# Patient Record
Sex: FEMALE | Race: WHITE | NOT HISPANIC OR LATINO | URBAN - METROPOLITAN AREA
[De-identification: names, ages, dates, MRNs, and addresses within clinical notes are randomized per-mention and may not be internally consistent; named-entity substitution may affect disease eponyms.]

---

## 2019-08-24 ENCOUNTER — EMERGENCY (EMERGENCY)
Facility: HOSPITAL | Age: 29
LOS: 1 days | Discharge: OTHER INSTITUTION | End: 2019-08-24
Attending: EMERGENCY MEDICINE
Payer: COMMERCIAL

## 2019-08-24 VITALS
HEIGHT: 59 IN | OXYGEN SATURATION: 97 % | WEIGHT: 190.04 LBS | SYSTOLIC BLOOD PRESSURE: 140 MMHG | HEART RATE: 94 BPM | DIASTOLIC BLOOD PRESSURE: 89 MMHG | RESPIRATION RATE: 16 BRPM | TEMPERATURE: 99 F

## 2019-08-24 DIAGNOSIS — F60.3 BORDERLINE PERSONALITY DISORDER: ICD-10-CM

## 2019-08-24 DIAGNOSIS — F32.9 MAJOR DEPRESSIVE DISORDER, SINGLE EPISODE, UNSPECIFIED: ICD-10-CM

## 2019-08-24 LAB
ALBUMIN SERPL ELPH-MCNC: 4.6 G/DL — SIGNIFICANT CHANGE UP (ref 3.3–5.2)
ALP SERPL-CCNC: 63 U/L — SIGNIFICANT CHANGE UP (ref 40–120)
ALT FLD-CCNC: 14 U/L — SIGNIFICANT CHANGE UP
AMPHET UR-MCNC: NEGATIVE — SIGNIFICANT CHANGE UP
ANION GAP SERPL CALC-SCNC: 11 MMOL/L — SIGNIFICANT CHANGE UP (ref 5–17)
APPEARANCE UR: CLEAR — SIGNIFICANT CHANGE UP
APTT BLD: 33.9 SEC — SIGNIFICANT CHANGE UP (ref 27.5–36.3)
AST SERPL-CCNC: 14 U/L — SIGNIFICANT CHANGE UP
BACTERIA # UR AUTO: ABNORMAL
BARBITURATES UR SCN-MCNC: NEGATIVE — SIGNIFICANT CHANGE UP
BENZODIAZ UR-MCNC: POSITIVE
BILIRUB SERPL-MCNC: 0.4 MG/DL — SIGNIFICANT CHANGE UP (ref 0.4–2)
BILIRUB UR-MCNC: NEGATIVE — SIGNIFICANT CHANGE UP
BUN SERPL-MCNC: 9 MG/DL — SIGNIFICANT CHANGE UP (ref 8–20)
CALCIUM SERPL-MCNC: 9.7 MG/DL — SIGNIFICANT CHANGE UP (ref 8.6–10.2)
CHLORIDE SERPL-SCNC: 100 MMOL/L — SIGNIFICANT CHANGE UP (ref 98–107)
CO2 SERPL-SCNC: 26 MMOL/L — SIGNIFICANT CHANGE UP (ref 22–29)
COCAINE METAB.OTHER UR-MCNC: NEGATIVE — SIGNIFICANT CHANGE UP
COLOR SPEC: YELLOW — SIGNIFICANT CHANGE UP
CREAT SERPL-MCNC: 0.58 MG/DL — SIGNIFICANT CHANGE UP (ref 0.5–1.3)
D DIMER BLD IA.RAPID-MCNC: <150 NG/ML DDU — SIGNIFICANT CHANGE UP
DIFF PNL FLD: ABNORMAL
EPI CELLS # UR: SIGNIFICANT CHANGE UP
GLUCOSE SERPL-MCNC: 86 MG/DL — SIGNIFICANT CHANGE UP (ref 70–115)
GLUCOSE UR QL: NEGATIVE MG/DL — SIGNIFICANT CHANGE UP
HCG SERPL-ACNC: <4 MIU/ML — SIGNIFICANT CHANGE UP
HCT VFR BLD CALC: 39.2 % — SIGNIFICANT CHANGE UP (ref 34.5–45)
HGB BLD-MCNC: 12 G/DL — SIGNIFICANT CHANGE UP (ref 11.5–15.5)
INR BLD: 1.13 RATIO — SIGNIFICANT CHANGE UP (ref 0.88–1.16)
KETONES UR-MCNC: ABNORMAL
LEUKOCYTE ESTERASE UR-ACNC: ABNORMAL
MCHC RBC-ENTMCNC: 24 PG — LOW (ref 27–34)
MCHC RBC-ENTMCNC: 30.6 GM/DL — LOW (ref 32–36)
MCV RBC AUTO: 78.2 FL — LOW (ref 80–100)
METHADONE UR-MCNC: NEGATIVE — SIGNIFICANT CHANGE UP
NITRITE UR-MCNC: NEGATIVE — SIGNIFICANT CHANGE UP
OPIATES UR-MCNC: NEGATIVE — SIGNIFICANT CHANGE UP
PCP SPEC-MCNC: SIGNIFICANT CHANGE UP
PCP UR-MCNC: POSITIVE
PH UR: 6.5 — SIGNIFICANT CHANGE UP (ref 5–8)
PLATELET # BLD AUTO: 278 K/UL — SIGNIFICANT CHANGE UP (ref 150–400)
POTASSIUM SERPL-MCNC: 4.4 MMOL/L — SIGNIFICANT CHANGE UP (ref 3.5–5.3)
POTASSIUM SERPL-SCNC: 4.4 MMOL/L — SIGNIFICANT CHANGE UP (ref 3.5–5.3)
PROT SERPL-MCNC: 7.2 G/DL — SIGNIFICANT CHANGE UP (ref 6.6–8.7)
PROT UR-MCNC: 15 MG/DL
PROTHROM AB SERPL-ACNC: 13 SEC — HIGH (ref 10–12.9)
RBC # BLD: 5.01 M/UL — SIGNIFICANT CHANGE UP (ref 3.8–5.2)
RBC # FLD: 16 % — HIGH (ref 10.3–14.5)
RBC CASTS # UR COMP ASSIST: SIGNIFICANT CHANGE UP /HPF (ref 0–4)
SODIUM SERPL-SCNC: 137 MMOL/L — SIGNIFICANT CHANGE UP (ref 135–145)
SP GR SPEC: 1.01 — SIGNIFICANT CHANGE UP (ref 1.01–1.02)
THC UR QL: POSITIVE
TROPONIN T SERPL-MCNC: <0.01 NG/ML — SIGNIFICANT CHANGE UP (ref 0–0.06)
UROBILINOGEN FLD QL: NEGATIVE MG/DL — SIGNIFICANT CHANGE UP
WBC # BLD: 7.22 K/UL — SIGNIFICANT CHANGE UP (ref 3.8–10.5)
WBC # FLD AUTO: 7.22 K/UL — SIGNIFICANT CHANGE UP (ref 3.8–10.5)
WBC UR QL: ABNORMAL

## 2019-08-24 PROCEDURE — 71046 X-RAY EXAM CHEST 2 VIEWS: CPT | Mod: 26

## 2019-08-24 PROCEDURE — 99284 EMERGENCY DEPT VISIT MOD MDM: CPT

## 2019-08-24 PROCEDURE — 93010 ELECTROCARDIOGRAM REPORT: CPT

## 2019-08-24 PROCEDURE — 90792 PSYCH DIAG EVAL W/MED SRVCS: CPT

## 2019-08-24 RX ORDER — LAMOTRIGINE 25 MG/1
250 TABLET, ORALLY DISINTEGRATING ORAL DAILY
Refills: 0 | Status: DISCONTINUED | OUTPATIENT
Start: 2019-08-24 | End: 2019-08-30

## 2019-08-24 RX ORDER — ALPRAZOLAM 0.25 MG
2 TABLET ORAL THREE TIMES A DAY
Refills: 0 | Status: DISCONTINUED | OUTPATIENT
Start: 2019-08-24 | End: 2019-08-24

## 2019-08-24 RX ORDER — CITALOPRAM 10 MG/1
40 TABLET, FILM COATED ORAL DAILY
Refills: 0 | Status: DISCONTINUED | OUTPATIENT
Start: 2019-08-24 | End: 2019-08-30

## 2019-08-24 RX ORDER — NITROFURANTOIN MACROCRYSTAL 50 MG
100 CAPSULE ORAL
Refills: 0 | Status: DISCONTINUED | OUTPATIENT
Start: 2019-08-24 | End: 2019-08-30

## 2019-08-24 RX ORDER — BUPROPION HYDROCHLORIDE 150 MG/1
150 TABLET, EXTENDED RELEASE ORAL DAILY
Refills: 0 | Status: DISCONTINUED | OUTPATIENT
Start: 2019-08-24 | End: 2019-08-30

## 2019-08-24 RX ORDER — NITROFURANTOIN MACROCRYSTAL 50 MG
100 CAPSULE ORAL ONCE
Refills: 0 | Status: COMPLETED | OUTPATIENT
Start: 2019-08-24 | End: 2019-08-24

## 2019-08-24 RX ORDER — LANOLIN ALCOHOL/MO/W.PET/CERES
3 CREAM (GRAM) TOPICAL AT BEDTIME
Refills: 0 | Status: DISCONTINUED | OUTPATIENT
Start: 2019-08-24 | End: 2019-08-30

## 2019-08-24 RX ADMIN — Medication 100 MILLIGRAM(S): at 22:22

## 2019-08-24 RX ADMIN — Medication 100 MILLIGRAM(S): at 15:47

## 2019-08-24 RX ADMIN — Medication 2 MILLIGRAM(S): at 22:22

## 2019-08-24 RX ADMIN — CITALOPRAM 40 MILLIGRAM(S): 10 TABLET, FILM COATED ORAL at 22:23

## 2019-08-24 NOTE — ED BEHAVIORAL HEALTH ASSESSMENT NOTE - SUICIDE RISK FACTORS
Access to means (pills, firearms, etc.)/Agitation/severe anxiety/Mood episode/Perceived burden on family and others/Substance abuse/dependence

## 2019-08-24 NOTE — ED PROVIDER NOTE - OBJECTIVE STATEMENT
29 year old female with PMHx bipolar disorder, anxiety, depression, borderline personality disorder presents to the ED for CP which started this morning. Pt states CP occurs when she takes deep breaths but is not present at rest. Pain is nonexertional in nature. Has not seen cardiologist recently. Denies SOB, cough, jaw pain, sweats, vomiting, recent travel, h/o CA, or taking hormones. Pt has h/o panic attacks but states this episode felt different then previous. Admits she has recently been more stressed than usual and that her  has told her she needs inpatient psychiatric care. States yesterday she cut herself and she occasionally has thoughts of SI in the past few days but denies any plan. NO HI. Pts psychiatrist is affiliated with [facility]; NP Dina Bui.
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
## 2019-08-24 NOTE — ED ADULT NURSE REASSESSMENT NOTE - NS ED NURSE REASSESS COMMENT FT1
assumed care of pt assumed care of patient. patient alert and cooperative. patient signed legal papers for admission SW working on placement . patient spoke with  and made aware to plan. patient resting with yellow gown and red socks.

## 2019-08-24 NOTE — ED ADULT NURSE NOTE - OBJECTIVE STATEMENT
Patient complaining of chest discomfort this moring she described as an anxiety attack, resolved, states she has intermittent suicide thoughts without plan over the past two weeks, bipolar and needs med adjustment

## 2019-08-24 NOTE — ED PROVIDER NOTE - CLINICAL SUMMARY MEDICAL DECISION MAKING FREE TEXT BOX
29 year old female with bipolar, borer line personality, anxiety, depression presents to ED for pleuritic chest pain. Likely due to anxiety. Given SI will obtain labs and move to  for psych eval.

## 2019-08-24 NOTE — ED BEHAVIORAL HEALTH ASSESSMENT NOTE - DETAILS
h/o cutting age 17, last 2 days ago. to follow currently with inlaws CP upon admission medically cleared n/a

## 2019-08-24 NOTE — ED PROVIDER NOTE - ATTENDING CONTRIBUTION TO CARE
I, Glenroy Powers, performed a face to face bedside interview with this patient regarding history of present illness, review of symptoms and relevant past medical, social and family history.  I completed an independent physical examination. I have communicated the patient’s plan of care and disposition with the ACP.    29 year old female with PMHx bipolar disorder, anxiety, depression, borderline personality disorder presents to the ED for CP which started this morning. Pt states CP occurs when she takes deep breaths but is not present at rest. . Pt has h/o panic attacks but states this episode felt different then previous. Admits she has recently been more stressed than usual and that her  has told her she needs inpatient psychiatric care. pe  awake alert in nad; heent ncat neck supple aliyah s1 s1  lungs clear abd soft ext left arm superficial abrasions;   dx depression; eval bh

## 2019-08-24 NOTE — ED BEHAVIORAL HEALTH ASSESSMENT NOTE - RISK ASSESSMENT
Acute Suicide Risk  (  ) High   (x  ) Moderate   (  ) Low   (  ) Unable to determine   Rationale:     Elevated Chronic Risk   ( x ) Yes   Details:   (  ) No

## 2019-08-24 NOTE — ED BEHAVIORAL HEALTH ASSESSMENT NOTE - DESCRIPTION
Cooperative anxious depressed, no behavioral disturbances.  ICU Vital Signs Last 24 Hrs  T(C): 37 (24 Aug 2019 15:34), Max: 37.2 (24 Aug 2019 11:43)  T(F): 98.6 (24 Aug 2019 15:34), Max: 98.9 (24 Aug 2019 11:43)  HR: 80 (24 Aug 2019 15:34) (63 - 94)  BP: 120/79 (24 Aug 2019 15:34) (120/79 - 140/89)  BP(mean): --  ABP: --  ABP(mean): --  RR: 18 (24 Aug 2019 15:34) (16 - 18)  SpO2: 99% (24 Aug 2019 15:34) (97% - 100%) none known , lives with  and 3 yo daughter

## 2019-08-24 NOTE — ED PROVIDER NOTE - CHPI ED SYMPTOMS NEG
no dizziness/no vomiting/no cough/no diaphoresis/no nausea/no chills/no syncope/no shortness of breath
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
## 2019-08-24 NOTE — ED ADULT NURSE NOTE - PMH
Anemia    Bipolar affective disorder, current episode depressed, current episode severity unspecified    HTN (hypertension)

## 2019-08-24 NOTE — ED PROVIDER NOTE - PHYSICAL EXAMINATION
+ SI, cooperative, vertical like abrasions to the L arm, heeling, likely due to cutting   Lungs CTA, = chest rise and fall   chest wall nontender to palp  abd soft and nontender, no rebound or guarding   A/O x 3, NAD
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
## 2019-08-24 NOTE — ED ADULT NURSE REASSESSMENT NOTE - NS ED NURSE REASSESS COMMENT FT1
patient medicated as ordered. patient on phone with  who is upset that no one will speak to him of the phone.  HIPPA explained to patient and .  patient understands that at this time there is no bed available for tonight and the she will be staying here til am and transferred in am when a bed becomes available.  safe environment maintained

## 2019-08-24 NOTE — ED ADULT NURSE NOTE - NSIMPLEMENTINTERV_GEN_ALL_ED
Implemented All Fall with Harm Risk Interventions:  [place] to call system. Call bell, personal items and telephone within reach. Instruct patient to call for assistance. Room bathroom lighting operational. Non-slip footwear when patient is off stretcher. Physically safe environment: no spills, clutter or unnecessary equipment. Stretcher in lowest position, wheels locked, appropriate side rails in place. Provide visual cue, wrist band, yellow gown, etc. Monitor gait and stability. Monitor for mental status changes and reorient to person, place, and time. Review medications for side effects contributing to fall risk. Reinforce activity limits and safety measures with patient and family. Provide visual clues: red socks.

## 2019-08-24 NOTE — ED ADULT TRIAGE NOTE - CHIEF COMPLAINT QUOTE
Pt BIBA A&Ox3 ambulatory in ED c/o chest discomfort x approx 2 hours with associated anxiety. Reports hx of feeling anxiety and depression, takes xanax normally. Denies any sob or difficulty breathing. Denies SI or HI. Pt in no apparent distress at this time, respirations even and unlabored.

## 2019-08-24 NOTE — ED ADULT NURSE REASSESSMENT NOTE - NS ED NURSE REASSESS COMMENT FT1
patient out of bed to bathroom voided. patient offered straight cath kit and states not needed at this time.  will maintain safe environment

## 2019-08-24 NOTE — ED BEHAVIORAL HEALTH NOTE - BEHAVIORAL HEALTH NOTE
SW NOTE:  presented pt to [facility]. Spoke to Pearl in admissions. They are reviewing. Pt is in agreement for psych transfer; signed 9.13 legals. SW to follow.

## 2019-08-25 ENCOUNTER — INPATIENT (INPATIENT)
Facility: HOSPITAL | Age: 29
LOS: 3 days | Discharge: HOME | DRG: 881 | End: 2019-08-29
Attending: PSYCHIATRY & NEUROLOGY | Admitting: PSYCHIATRY & NEUROLOGY
Payer: COMMERCIAL

## 2019-08-25 VITALS
HEART RATE: 62 BPM | DIASTOLIC BLOOD PRESSURE: 80 MMHG | RESPIRATION RATE: 18 BRPM | TEMPERATURE: 98 F | OXYGEN SATURATION: 99 % | SYSTOLIC BLOOD PRESSURE: 124 MMHG

## 2019-08-25 VITALS
RESPIRATION RATE: 16 BRPM | DIASTOLIC BLOOD PRESSURE: 86 MMHG | SYSTOLIC BLOOD PRESSURE: 137 MMHG | TEMPERATURE: 98 F | HEIGHT: 59 IN | OXYGEN SATURATION: 98 % | HEART RATE: 70 BPM | WEIGHT: 201.06 LBS

## 2019-08-25 DIAGNOSIS — F31.30 BIPOLAR DISORDER, CURRENT EPISODE DEPRESSED, MILD OR MODERATE SEVERITY, UNSPECIFIED: ICD-10-CM

## 2019-08-25 DIAGNOSIS — F32.9 MAJOR DEPRESSIVE DISORDER, SINGLE EPISODE, UNSPECIFIED: ICD-10-CM

## 2019-08-25 DIAGNOSIS — K21.9 GASTRO-ESOPHAGEAL REFLUX DISEASE WITHOUT ESOPHAGITIS: ICD-10-CM

## 2019-08-25 DIAGNOSIS — F17.200 NICOTINE DEPENDENCE, UNSPECIFIED, UNCOMPLICATED: ICD-10-CM

## 2019-08-25 DIAGNOSIS — R82.90 UNSPECIFIED ABNORMAL FINDINGS IN URINE: ICD-10-CM

## 2019-08-25 DIAGNOSIS — Z29.9 ENCOUNTER FOR PROPHYLACTIC MEASURES, UNSPECIFIED: ICD-10-CM

## 2019-08-25 PROCEDURE — 85027 COMPLETE CBC AUTOMATED: CPT

## 2019-08-25 PROCEDURE — 85610 PROTHROMBIN TIME: CPT

## 2019-08-25 PROCEDURE — 81001 URINALYSIS AUTO W/SCOPE: CPT

## 2019-08-25 PROCEDURE — 36415 COLL VENOUS BLD VENIPUNCTURE: CPT

## 2019-08-25 PROCEDURE — 80307 DRUG TEST PRSMV CHEM ANLYZR: CPT

## 2019-08-25 PROCEDURE — 80053 COMPREHEN METABOLIC PANEL: CPT

## 2019-08-25 PROCEDURE — 85379 FIBRIN DEGRADATION QUANT: CPT

## 2019-08-25 PROCEDURE — 85730 THROMBOPLASTIN TIME PARTIAL: CPT

## 2019-08-25 PROCEDURE — 93005 ELECTROCARDIOGRAM TRACING: CPT

## 2019-08-25 PROCEDURE — 84702 CHORIONIC GONADOTROPIN TEST: CPT

## 2019-08-25 PROCEDURE — 99285 EMERGENCY DEPT VISIT HI MDM: CPT | Mod: 25

## 2019-08-25 PROCEDURE — 84484 ASSAY OF TROPONIN QUANT: CPT

## 2019-08-25 PROCEDURE — 71046 X-RAY EXAM CHEST 2 VIEWS: CPT

## 2019-08-25 RX ORDER — NICOTINE POLACRILEX 2 MG
1 GUM BUCCAL DAILY
Refills: 0 | Status: DISCONTINUED | OUTPATIENT
Start: 2019-08-25 | End: 2019-08-29

## 2019-08-25 RX ORDER — HALOPERIDOL DECANOATE 100 MG/ML
5 INJECTION INTRAMUSCULAR EVERY 6 HOURS
Refills: 0 | Status: DISCONTINUED | OUTPATIENT
Start: 2019-08-25 | End: 2019-08-27

## 2019-08-25 RX ORDER — BUPROPION HYDROCHLORIDE 150 MG/1
150 TABLET, EXTENDED RELEASE ORAL DAILY
Refills: 0 | Status: DISCONTINUED | OUTPATIENT
Start: 2019-08-25 | End: 2019-08-26

## 2019-08-25 RX ORDER — PANTOPRAZOLE SODIUM 20 MG/1
40 TABLET, DELAYED RELEASE ORAL
Refills: 0 | Status: DISCONTINUED | OUTPATIENT
Start: 2019-08-25 | End: 2019-08-29

## 2019-08-25 RX ORDER — ACETAMINOPHEN 500 MG
650 TABLET ORAL ONCE
Refills: 0 | Status: COMPLETED | OUTPATIENT
Start: 2019-08-25 | End: 2019-08-25

## 2019-08-25 RX ORDER — LANOLIN ALCOHOL/MO/W.PET/CERES
3 CREAM (GRAM) TOPICAL AT BEDTIME
Refills: 0 | Status: DISCONTINUED | OUTPATIENT
Start: 2019-08-25 | End: 2019-08-29

## 2019-08-25 RX ORDER — NICOTINE POLACRILEX 2 MG
1 GUM BUCCAL DAILY
Refills: 0 | Status: DISCONTINUED | OUTPATIENT
Start: 2019-08-25 | End: 2019-08-30

## 2019-08-25 RX ORDER — CITALOPRAM 10 MG/1
40 TABLET, FILM COATED ORAL DAILY
Refills: 0 | Status: DISCONTINUED | OUTPATIENT
Start: 2019-08-25 | End: 2019-08-29

## 2019-08-25 RX ORDER — NICOTINE POLACRILEX 2 MG
1 GUM BUCCAL
Refills: 0 | Status: DISCONTINUED | OUTPATIENT
Start: 2019-08-25 | End: 2019-08-29

## 2019-08-25 RX ORDER — PANTOPRAZOLE SODIUM 20 MG/1
40 TABLET, DELAYED RELEASE ORAL ONCE
Refills: 0 | Status: DISCONTINUED | OUTPATIENT
Start: 2019-08-25 | End: 2019-08-30

## 2019-08-25 RX ORDER — NITROFURANTOIN MACROCRYSTAL 50 MG
100 CAPSULE ORAL
Refills: 0 | Status: DISCONTINUED | OUTPATIENT
Start: 2019-08-25 | End: 2019-08-29

## 2019-08-25 RX ORDER — LAMOTRIGINE 25 MG/1
225 TABLET, ORALLY DISINTEGRATING ORAL DAILY
Refills: 0 | Status: DISCONTINUED | OUTPATIENT
Start: 2019-08-25 | End: 2019-08-29

## 2019-08-25 RX ADMIN — Medication 1 PATCH: at 08:54

## 2019-08-25 RX ADMIN — BUPROPION HYDROCHLORIDE 150 MILLIGRAM(S): 150 TABLET, EXTENDED RELEASE ORAL at 15:46

## 2019-08-25 RX ADMIN — Medication 650 MILLIGRAM(S): at 01:07

## 2019-08-25 RX ADMIN — LAMOTRIGINE 225 MILLIGRAM(S): 25 TABLET, ORALLY DISINTEGRATING ORAL at 15:47

## 2019-08-25 RX ADMIN — Medication 1 PATCH: at 19:59

## 2019-08-25 RX ADMIN — Medication 100 MILLIGRAM(S): at 15:48

## 2019-08-25 RX ADMIN — Medication 1 EACH: at 18:49

## 2019-08-25 RX ADMIN — Medication 1 PATCH: at 01:07

## 2019-08-25 RX ADMIN — CITALOPRAM 40 MILLIGRAM(S): 10 TABLET, FILM COATED ORAL at 15:47

## 2019-08-25 RX ADMIN — Medication 1 EACH: at 20:50

## 2019-08-25 RX ADMIN — HALOPERIDOL DECANOATE 5 MILLIGRAM(S): 100 INJECTION INTRAMUSCULAR at 20:47

## 2019-08-25 RX ADMIN — Medication 1 PATCH: at 15:47

## 2019-08-25 RX ADMIN — Medication 2 MILLIGRAM(S): at 15:58

## 2019-08-25 RX ADMIN — Medication 100 MILLIGRAM(S): at 09:13

## 2019-08-25 RX ADMIN — Medication 3 MILLIGRAM(S): at 20:47

## 2019-08-25 RX ADMIN — PANTOPRAZOLE SODIUM 40 MILLIGRAM(S): 20 TABLET, DELAYED RELEASE ORAL at 18:48

## 2019-08-25 NOTE — ED BEHAVIORAL HEALTH NOTE - BEHAVIORAL HEALTH NOTE
social work note:  writer spoke with ms Henrique LEMOS at [facility] , the patient was declined for admission.  spoke with Sofiya at [facility] and they have the clinical, waiting for the md to review.   will follow.
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
## 2019-08-25 NOTE — H&P ADULT - ASSESSMENT
29 years old female with past medical history of pre-eclampsia, GERD, tobacco use disorder, bipolar disorder, anxiety, depression and borderline personality disorder, who is admitted to inpatient psychiatry for stabilization of his mood and medications.  Patient is seen for medical history and physical.

## 2019-08-25 NOTE — ED BEHAVIORAL HEALTH NOTE - BEHAVIORAL HEALTH NOTE
social work note: writer spoke with Dr. Juarez who will accept the patient at [facility] , faxed the legals and face sheet as requested.  waiting for RN to call, will follow for transfer .

## 2019-08-25 NOTE — ED BEHAVIORAL HEALTH NOTE - BEHAVIORAL HEALTH NOTE
social work note: Linda SILVER called for report on the patient, ADRIANNE Ramos gave report.  writer set up transportation.   patient will need AUTH.  SW  will call [facility] to obtain auth

## 2019-08-25 NOTE — H&P ADULT - NSHPSOCIALHISTORY_GEN_ALL_CORE
Social History:    Marital Status:   Occupation: unemployed  Lives with: Spouse and children    Substance Use : Cannabis   Tobacco Usage:  Daily smoker.   Alcohol Usage: Denies

## 2019-08-25 NOTE — H&P ADULT - PROBLEM SELECTOR PLAN 1
Patient takes Omeprazole as out patient.   Will place patient on Protonix as per hospital formulary.   GERD precautions.

## 2019-08-25 NOTE — H&P ADULT - HISTORY OF PRESENT ILLNESS
29 years old female with past medical history of pre-eclampsia, GERD, tobacco use disorder, bipolar disorder, anxiety, depression and borderline personality disorder, who is admitted to inpatient psychiatry for stabilization of his mood and medications.     Patient is seen for medical history and physical.   She is c/o reflux symptoms.   Denies any chest pain or pressure.   No nausea or vomiting.   No fever or chills or rigors.   No dizziness or syncope.

## 2019-08-25 NOTE — ED ADULT NURSE REASSESSMENT NOTE - NS ED NURSE REASSESS COMMENT FT1
spoke with Frieda and Dr. Bejarano from [facility] requesting further information ie: repeat EKG and troponin, and insurance verification  and that without further information they will not take patient tonight. Dr. Crum made aware

## 2019-08-25 NOTE — ED ADULT NURSE REASSESSMENT NOTE - NS ED NURSE REASSESS COMMENT FT1
Patient educated about pending transfer.  No attempts to harm self or others.  Patient educated about plan of care and pending transfer.

## 2019-08-25 NOTE — PATIENT PROFILE BEHAVIORAL HEALTH - NSBHPSYHX_PSY_A_CORE
self-harm/history of cutting, last couple of days ago used scissors to make superficial cuts to left forearm. Started at age 14.

## 2019-08-25 NOTE — ED ADULT NURSE REASSESSMENT NOTE - STATUS
awaiting transfer, no change
awaiting consult
awaiting transfer, no change

## 2019-08-25 NOTE — ED ADULT NURSE REASSESSMENT NOTE - NS ED NURSE REASSESS COMMENT FT1
Assumed care of patient at 0720.  Patient awake sitting in bed.  Patient pleasant on interaction.  No attempts to harm self or others.  Patient verbalized understanding of plan of care including pending transfer when a bed is available.

## 2019-08-25 NOTE — ED ADULT NURSE REASSESSMENT NOTE - NS ED NURSE REASSESS COMMENT FT1
patient resting in bed. patient out of bed to bathroom and returning to bed. no c/o  will maintain safe environment

## 2019-08-25 NOTE — H&P ADULT - NSHPPHYSICALEXAM_GEN_ALL_CORE
Vital Signs Last 24 Hrs  T(C): 36.8 (25 Aug 2019 14:36), Max: 37.1 (25 Aug 2019 00:10)  T(F): 98.3 (25 Aug 2019 14:36), Max: 98.8 (25 Aug 2019 00:10)  HR: 70 (25 Aug 2019 14:36) (58 - 84)  BP: 137/86 (25 Aug 2019 14:36) (107/72 - 137/86)  BP(mean): --  RR: 16 (25 Aug 2019 14:36) (16 - 19)  SpO2: 98% (25 Aug 2019 14:36) (98% - 100%)    PHYSICAL EXAM:  GENERAL: well-groomed, well-developed  HEAD:  Atraumatic, Normocephalic  EYES: EOMI, PERRLA, conjunctiva and sclera clear  ENMT: Moist mucous membranes, No lesions  NECK: Supple,   CHEST/LUNG: Clear to auscultation bilaterally; No rales, rhonchi, wheezing, or rubs  HEART: Regular rate and rhythm; No murmurs, rubs, or gallops  ABDOMEN: Soft, Nontender, Nondistended; Bowel sounds present  EXTREMITIES:  2+ Peripheral Pulses, No clubbing, cyanosis, or edema  MS: No joint swelling or deformity.   LYMPH: No lymphadenopathy noted  SKIN: No rashes or lesions  NERVOUS SYSTEM:  no focal deficit.   PSYCH:  Awake and alert.

## 2019-08-25 NOTE — ED ADULT NURSE REASSESSMENT NOTE - GENERAL PATIENT STATE
cooperative/comfortable appearance
anxious
cooperative/comfortable appearance
resting/sleeping/comfortable appearance
resting/sleeping/comfortable appearance

## 2019-08-25 NOTE — H&P ADULT - NSHPREVIEWOFSYSTEMS_GEN_ALL_CORE
REVIEW OF SYSTEMS:  CONSTITUTIONAL: No fever, weight loss, or fatigue  EYES: No eye pain, or discharge  ENMT:   No sinus or throat pain  NECK: No pain or stiffness  BREASTS: No pain, masses, or nipple discharge  RESPIRATORY: No cough, wheezing, chills or hemoptysis; No shortness of breath  CARDIOVASCULAR: No chest pain, palpitations, dizziness, or leg swelling  GASTROINTESTINAL: + reflux symptoms.   GENITOURINARY: No dysuria, frequency, hematuria, or incontinence  NEUROLOGICAL: No headaches, memory loss, loss of strength, numbness, or tremors  SKIN: No itching, burning, rashes, or lesions   LYMPH NODES: No enlarged glands  ENDOCRINE: No heat or cold intolerance; No hair loss; No polydipsia or polyuria  MUSCULOSKELETAL: No joint pain or swelling; No muscle, back, or extremity pain  HEME/LYMPH: No easy bruising, or bleeding gums  ALLERGY AND IMMUNOLOGIC: No hives or eczema

## 2019-08-25 NOTE — ED ADULT NURSE REASSESSMENT NOTE - COMFORT CARE
wait time explained/po fluids offered/plan of care explained
meal provided/plan of care explained
ambulated to bathroom/meal provided/po fluids offered
plan of care explained
side rails up/darkened lights/po fluids offered/wait time explained/plan of care explained/warm blanket provided

## 2019-08-25 NOTE — H&P ADULT - NSICDXPASTMEDICALHX_GEN_ALL_CORE_FT
PAST MEDICAL HISTORY:  Anemia     Bipolar affective disorder, current episode depressed, current episode severity unspecified     GERD (gastroesophageal reflux disease)     Preeclampsia     Tobacco use disorder

## 2019-08-25 NOTE — ED ADULT NURSE REASSESSMENT NOTE - NS ED NURSE REASSESS COMMENT FT1
nursing report called to Maurilio SILVER at [address] [place] [address].  Patient is pending transfer.

## 2019-08-26 LAB
CHOLEST SERPL-MCNC: 169 MG/DL — SIGNIFICANT CHANGE UP (ref 10–199)
HBA1C BLD-MCNC: 5.5 % — SIGNIFICANT CHANGE UP (ref 4–5.6)
HDLC SERPL-MCNC: 48 MG/DL — LOW
LIPID PNL WITH DIRECT LDL SERPL: 109 MG/DL — HIGH
T PALLIDUM AB TITR SER: NEGATIVE — SIGNIFICANT CHANGE UP
TOTAL CHOLESTEROL/HDL RATIO MEASUREMENT: 3.5 RATIO — SIGNIFICANT CHANGE UP (ref 3.3–7.1)
TRIGL SERPL-MCNC: 60 MG/DL — SIGNIFICANT CHANGE UP (ref 10–149)
TSH SERPL-MCNC: 1.35 UIU/ML — SIGNIFICANT CHANGE UP (ref 0.27–4.2)

## 2019-08-26 PROCEDURE — 93010 ELECTROCARDIOGRAM REPORT: CPT

## 2019-08-26 PROCEDURE — 99233 SBSQ HOSP IP/OBS HIGH 50: CPT

## 2019-08-26 RX ORDER — LITHIUM CARBONATE 300 MG/1
150 TABLET, EXTENDED RELEASE ORAL
Refills: 0 | Status: DISCONTINUED | OUTPATIENT
Start: 2019-08-26 | End: 2019-08-29

## 2019-08-26 RX ADMIN — PANTOPRAZOLE SODIUM 40 MILLIGRAM(S): 20 TABLET, DELAYED RELEASE ORAL at 07:15

## 2019-08-26 RX ADMIN — Medication 1 EACH: at 08:42

## 2019-08-26 RX ADMIN — Medication 3 MILLIGRAM(S): at 22:01

## 2019-08-26 RX ADMIN — BUPROPION HYDROCHLORIDE 150 MILLIGRAM(S): 150 TABLET, EXTENDED RELEASE ORAL at 08:38

## 2019-08-26 RX ADMIN — Medication 1 EACH: at 18:00

## 2019-08-26 RX ADMIN — Medication 1 EACH: at 20:42

## 2019-08-26 RX ADMIN — Medication 2 MILLIGRAM(S): at 07:14

## 2019-08-26 RX ADMIN — Medication 1 PATCH: at 21:18

## 2019-08-26 RX ADMIN — CITALOPRAM 40 MILLIGRAM(S): 10 TABLET, FILM COATED ORAL at 08:38

## 2019-08-26 RX ADMIN — Medication 2 MILLIGRAM(S): at 18:00

## 2019-08-26 RX ADMIN — Medication 1 PATCH: at 08:38

## 2019-08-26 RX ADMIN — Medication 100 MILLIGRAM(S): at 15:17

## 2019-08-26 RX ADMIN — Medication 1 EACH: at 13:06

## 2019-08-26 RX ADMIN — HALOPERIDOL DECANOATE 5 MILLIGRAM(S): 100 INJECTION INTRAMUSCULAR at 22:01

## 2019-08-26 RX ADMIN — Medication 1 EACH: at 15:53

## 2019-08-26 RX ADMIN — LAMOTRIGINE 225 MILLIGRAM(S): 25 TABLET, ORALLY DISINTEGRATING ORAL at 08:38

## 2019-08-26 RX ADMIN — LITHIUM CARBONATE 150 MILLIGRAM(S): 300 TABLET, EXTENDED RELEASE ORAL at 20:41

## 2019-08-26 RX ADMIN — Medication 1 PATCH: at 08:47

## 2019-08-26 RX ADMIN — Medication 100 MILLIGRAM(S): at 08:38
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
## 2019-08-26 NOTE — OCCUPATIONAL THERAPY INITIAL EVALUATION ADULT - PERTINENT HX OF CURRENT PROBLEM, REHAB EVAL
This is a 29 y.o. female with worsening depression. Daily cannabis use and self injurious behavior of cutting self. Not getting along with  and sleeping on the couch.

## 2019-08-26 NOTE — PROGRESS NOTE BEHAVIORAL HEALTH - NSBHFUPINTERVALHXFT_PSY_A_CORE
Met with and evaluated patient with Dr. Juarez.  Chart reviewed and case discussed in tx team meeting.  No significant interval events are reported.  Patient is verbal and cooperative and makes her needs known.  She would like to be on Lithium in addition to her Lamictal, would like to stop the Wellbutrin, and continue the Celexa.  Patient discusses that this is her first hospitalization, and she would like her Rx to be changed.  Denies any SI or HI.  No Rx SE are noted or reported. Met with and evaluated patient with Dr. Juarez.  Chart reviewed and case discussed in tx team meeting.  No significant interval events are reported.  Patient is verbal and cooperative and makes her needs known.  She would like to be on Lithium in addition to her Lamictal, would like to stop the Wellbutrin, and continue the Celexa.  Patient discusses that this is her first hospitalization, and she would like her Rx to be changed.  Denies any SI or HI.  No Rx SE are noted or reported.    08/26/2019: Patient a 30 y/o   female, domiciled with her , unemployed, and has a 3 y/o daughter, who is being taken care by her in-laws, no prior Psychiatric Hospitalization, no prior SI/SA, hx of Smoking Cannabis daily, Past psychiatric hx of Bipolar D/O, was seeing an NP Cindy Bui at [facility] and takes her meds as suggested, which includes, Lamictal 300 mg Daily; Celexa 40 mg and Xanax 2 mg TID, with Ambien 10 mg HS. She feels her meds are not working, she seems to have mood swings, she had a manic episode 2 weeks earlier when she spent $ 1000 and seems to have strained relation with her . She also scratched herself on her left forearm with scissors 2 days earlier. She continues to have ongoing Anxiety/Mood swings and was the reason she came to ED @ [facility] and later was admitted voluntarily so she would be able to have her meds re-adjusted for stability. She agreed to have Lithium as one of her friend has been using with good results, she was explained the Advantages/Risks/Benefits of Lithium and agreed to have Lithium 150 mg initially, later to titrate as needed for stability. She is not S/H at this time. To be covered by [employer] [facility] for her meds.

## 2019-08-26 NOTE — OCCUPATIONAL THERAPY INITIAL EVALUATION ADULT - PERSONAL SAFETY AND JUDGMENT, REHAB EVAL
at risk behaviors demonstrated/impaired/lacks appropriate coping skills; daily cannabis use with young child in home.

## 2019-08-26 NOTE — PROGRESS NOTE BEHAVIORAL HEALTH - NSBHADMITIPBHPROVFT_PSY_A_CORE
called [facility] in [place]  ([phone]) and they refused to provide any info unless they received consent.  To have patient sign consent and fax to [phone] called [facility] in [place]  ([phone])  They will not release info unless a consent is faxed to them

## 2019-08-27 PROCEDURE — 99233 SBSQ HOSP IP/OBS HIGH 50: CPT

## 2019-08-27 RX ORDER — DIPHENHYDRAMINE HCL 50 MG
50 CAPSULE ORAL ONCE
Refills: 0 | Status: COMPLETED | OUTPATIENT
Start: 2019-08-27 | End: 2019-08-27

## 2019-08-27 RX ADMIN — Medication 50 MILLIGRAM(S): at 08:56

## 2019-08-27 RX ADMIN — Medication 1 EACH: at 18:44

## 2019-08-27 RX ADMIN — Medication 1 EACH: at 13:11

## 2019-08-27 RX ADMIN — PANTOPRAZOLE SODIUM 40 MILLIGRAM(S): 20 TABLET, DELAYED RELEASE ORAL at 08:16

## 2019-08-27 RX ADMIN — Medication 100 MILLIGRAM(S): at 17:24

## 2019-08-27 RX ADMIN — Medication 50 MILLIGRAM(S): at 19:11

## 2019-08-27 RX ADMIN — Medication 1 MILLIGRAM(S): at 20:18

## 2019-08-27 RX ADMIN — Medication 1 PATCH: at 08:20

## 2019-08-27 RX ADMIN — Medication 100 MILLIGRAM(S): at 08:16

## 2019-08-27 RX ADMIN — LAMOTRIGINE 225 MILLIGRAM(S): 25 TABLET, ORALLY DISINTEGRATING ORAL at 08:16

## 2019-08-27 RX ADMIN — LITHIUM CARBONATE 150 MILLIGRAM(S): 300 TABLET, EXTENDED RELEASE ORAL at 08:16

## 2019-08-27 RX ADMIN — Medication 1 MILLIGRAM(S): at 13:10

## 2019-08-27 RX ADMIN — Medication 3 MILLIGRAM(S): at 20:18

## 2019-08-27 RX ADMIN — LITHIUM CARBONATE 150 MILLIGRAM(S): 300 TABLET, EXTENDED RELEASE ORAL at 20:18

## 2019-08-27 RX ADMIN — Medication 2 MILLIGRAM(S): at 08:16

## 2019-08-27 RX ADMIN — CITALOPRAM 40 MILLIGRAM(S): 10 TABLET, FILM COATED ORAL at 08:16

## 2019-08-27 RX ADMIN — Medication 1 PATCH: at 08:16

## 2019-08-27 NOTE — PROGRESS NOTE BEHAVIORAL HEALTH - NSBHFUPINTERVALHXFT_PSY_A_CORE
Met with and evaluated patient.  Chart reviewed and case discussed in tx team meeting.  No significant interval events are reported except patient submitted a 72 hour letter yesterday, and reports she would like to leave. Patient had Haldol last evening, and had EPS today (twitching of mouth) which responded well to Benadryl.  Patient is verbal and cooperative and makes her needs known. tolerating Lithium well.  Discussed Rx, and tx on unit and 72 hour letter with patient and her friend Tricia, at patient's request.  Explored issues with patient and her friend and discussed DC options.   Denies any SI or HI.  No Rx SE are noted or reported except EPS due to haldol   Haldol DC.  Patient had c/o severe anxiety at times, Ativan 1mg tid ordered.  Dr. Cox notified of need for cariology consult due to abnormal EKG and reports he will see patient on 8/28
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
## 2019-08-27 NOTE — PROGRESS NOTE BEHAVIORAL HEALTH - NSBHADMITIPBHPROVFT_PSY_A_CORE
called [facility] in [place]  ([phone]) and they refused to provide any info unless they received consent.  To have patient sign consent and fax to [phone] called [facility] in [place]  ([phone])  They will not release info unless a consent is faxed to them and consent was faxed to them.  Called again on 8/27, and left message for Ms Bui patient's provider to call me back.

## 2019-08-28 PROCEDURE — 99233 SBSQ HOSP IP/OBS HIGH 50: CPT

## 2019-08-28 RX ORDER — LAMOTRIGINE 25 MG/1
9 TABLET, ORALLY DISINTEGRATING ORAL
Qty: 270 | Refills: 0
Start: 2019-08-28 | End: 2019-09-26

## 2019-08-28 RX ORDER — DOCUSATE SODIUM 100 MG
100 CAPSULE ORAL
Refills: 0 | Status: DISCONTINUED | OUTPATIENT
Start: 2019-08-28 | End: 2019-08-29

## 2019-08-28 RX ORDER — CITALOPRAM 10 MG/1
1 TABLET, FILM COATED ORAL
Qty: 0 | Refills: 0 | DISCHARGE

## 2019-08-28 RX ORDER — NITROFURANTOIN MACROCRYSTAL 50 MG
1 CAPSULE ORAL
Qty: 6 | Refills: 0
Start: 2019-08-28 | End: 2019-08-30

## 2019-08-28 RX ORDER — PANTOPRAZOLE SODIUM 20 MG/1
1 TABLET, DELAYED RELEASE ORAL
Qty: 30 | Refills: 0
Start: 2019-08-28 | End: 2019-09-26

## 2019-08-28 RX ORDER — CITALOPRAM 10 MG/1
1 TABLET, FILM COATED ORAL
Qty: 30 | Refills: 0
Start: 2019-08-28 | End: 2019-09-26

## 2019-08-28 RX ORDER — LITHIUM CARBONATE 300 MG/1
1 TABLET, EXTENDED RELEASE ORAL
Qty: 60 | Refills: 0
Start: 2019-08-28 | End: 2019-09-26

## 2019-08-28 RX ORDER — ALPRAZOLAM 0.25 MG
1 TABLET ORAL
Qty: 0 | Refills: 0 | DISCHARGE

## 2019-08-28 RX ORDER — DOCUSATE SODIUM 100 MG
1 CAPSULE ORAL
Qty: 60 | Refills: 0
Start: 2019-08-28 | End: 2019-09-26

## 2019-08-28 RX ORDER — LAMOTRIGINE 25 MG/1
300 TABLET, ORALLY DISINTEGRATING ORAL
Qty: 0 | Refills: 0 | DISCHARGE

## 2019-08-28 RX ORDER — ACETAMINOPHEN 500 MG
650 TABLET ORAL EVERY 6 HOURS
Refills: 0 | Status: DISCONTINUED | OUTPATIENT
Start: 2019-08-28 | End: 2019-08-29

## 2019-08-28 RX ORDER — LANOLIN ALCOHOL/MO/W.PET/CERES
1 CREAM (GRAM) TOPICAL
Qty: 30 | Refills: 0
Start: 2019-08-28 | End: 2019-09-26

## 2019-08-28 RX ADMIN — Medication 1 EACH: at 16:27

## 2019-08-28 RX ADMIN — Medication 650 MILLIGRAM(S): at 18:15

## 2019-08-28 RX ADMIN — PANTOPRAZOLE SODIUM 40 MILLIGRAM(S): 20 TABLET, DELAYED RELEASE ORAL at 09:12

## 2019-08-28 RX ADMIN — Medication 1 PATCH: at 09:16

## 2019-08-28 RX ADMIN — LITHIUM CARBONATE 150 MILLIGRAM(S): 300 TABLET, EXTENDED RELEASE ORAL at 09:13

## 2019-08-28 RX ADMIN — Medication 1 MILLIGRAM(S): at 20:16

## 2019-08-28 RX ADMIN — Medication 1 EACH: at 14:11

## 2019-08-28 RX ADMIN — Medication 1 EACH: at 20:44

## 2019-08-28 RX ADMIN — Medication 100 MILLIGRAM(S): at 16:27

## 2019-08-28 RX ADMIN — Medication 1 MILLIGRAM(S): at 12:59

## 2019-08-28 RX ADMIN — LITHIUM CARBONATE 150 MILLIGRAM(S): 300 TABLET, EXTENDED RELEASE ORAL at 20:16

## 2019-08-28 RX ADMIN — Medication 650 MILLIGRAM(S): at 18:00

## 2019-08-28 RX ADMIN — CITALOPRAM 40 MILLIGRAM(S): 10 TABLET, FILM COATED ORAL at 09:13

## 2019-08-28 RX ADMIN — Medication 3 MILLIGRAM(S): at 20:19

## 2019-08-28 RX ADMIN — Medication 100 MILLIGRAM(S): at 09:13

## 2019-08-28 RX ADMIN — LAMOTRIGINE 225 MILLIGRAM(S): 25 TABLET, ORALLY DISINTEGRATING ORAL at 09:13

## 2019-08-28 RX ADMIN — Medication 1 MILLIGRAM(S): at 07:47

## 2019-08-28 RX ADMIN — Medication 1 EACH: at 10:07

## 2019-08-28 RX ADMIN — Medication 100 MILLIGRAM(S): at 20:16

## 2019-08-28 NOTE — DISCHARGE NOTE BEHAVIORAL HEALTH - NSBHDCSUICSAFETYFT_PSY_A_CORE
Patient reports if she has SI after DC she will:  go to the nearest ED, or call 911, or the  Crisis Center or the Suicide Hotline (was given phone numbers) or call or go to the [facility] Crisis Walk in Center ( has address and phone number).  She also reports she can reach out to her friend, Tricia, who is a Crisis Counselor for help.

## 2019-08-28 NOTE — PROGRESS NOTE BEHAVIORAL HEALTH - NSBHADMITIPBHPROVFT_PSY_A_CORE
called [facility] in [place]  ([phone])  They will not release info unless a consent is faxed to them and consent was faxed to them.  Called again on 8/27, and left message for Ms Bui patient's provider to call me back.  Spoke with Ms. Bui on 8/27, and she reports that patient struggles with anxiety, and and has difficulty with mood.  She also discussed that patient had difficulty with SIBs.

## 2019-08-28 NOTE — DISCHARGE NOTE BEHAVIORAL HEALTH - NSBHDCSUICPROTECTFT_PSY_A_CORE
responsibility to others, IDs reasons to live, future oriented, supportive friends and family, motivated for aftercare and Rx compliance.

## 2019-08-28 NOTE — CONSULT NOTE ADULT - SUBJECTIVE AND OBJECTIVE BOX
History of Present Illness: The patient is a 29 year old male with a history of bipolar disorder, borderline personality disorder who is admitted to inpatient psychiatry. She was noted to have a prolonged QTc on ECG. She feels anxious but has no complaints of chest pain or shortness of breath.    Past Medical/Surgical History:  Bipolar disorder, borderline personality disorder    Medications:  MEDICATIONS  (STANDING):  citalopram 40 milliGRAM(s) Oral daily  lamoTRIgine 225 milliGRAM(s) Oral daily  lithium 150 milliGRAM(s) Oral two times a day  LORazepam     Tablet 1 milliGRAM(s) Oral three times a day  nicotine - 21 mG/24Hr(s) Patch 1 patch Transdermal daily  nitrofurantoin monohydrate/macrocrystals (MACROBID) 100 milliGRAM(s) Oral two times a day with meals  pantoprazole    Tablet 40 milliGRAM(s) Oral before breakfast    MEDICATIONS  (PRN):  LORazepam     Tablet 1 milliGRAM(s) Oral every 12 hours PRN anxiety  melatonin 3 milliGRAM(s) Oral at bedtime PRN Insomnia  nicotine - Inhaler 1 Each Inhalation every 2 hours PRN Nicotine cravings      Family History: Non-contributory family history of premature cardiovascular atherosclerotic disease    Social History: No tobacco, alcohol or drug use    Review of Systems:  General: No fevers, chills, weight loss or gain  Skin: No rashes, color changes  Cardiovascular: No chest pain, orthopnea  Respiratory: No shortness of breath, cough  Gastrointestinal: No nausea, abdominal pain  Genitourinary: No incontinence, pain with urination  Musculoskeletal: No pain, swelling, decreased range of motion  Neurological: No headache, weakness  Psychiatric: No depression, anxiety  Endocrine: No weight loss or gain, increased thirst  All other systems are comprehensively negative.    Physical Exam:  Vitals:        Vital Signs Last 24 Hrs  T(C): 36.3 (28 Aug 2019 07:38), Max: 36.3 (28 Aug 2019 07:38)  T(F): 97.4 (28 Aug 2019 07:38), Max: 97.4 (28 Aug 2019 07:38)  HR: 116 (28 Aug 2019 07:38) (104 - 116)  BP: 135/80 (28 Aug 2019 07:38) (135/80 - 135/88)  BP(mean): --  RR: 18 (28 Aug 2019 07:38) (18 - 19)  SpO2: 94% (28 Aug 2019 07:38) (94% - 98%)  General: NAD  HEENT: MMM  Neck: No JVD, no carotid bruit  Lungs: CTAB  CV: RRR, nl S1/S2, no M/R/G  Abdomen: S/NT/ND, +BS  Extremities: No LE edema, no cyanosis  Neuro: AAOx3, non-focal  Skin: No rash    Labs:                  ECG: NSR, normal axis, no ST abnormality, borderline prolonged QTc

## 2019-08-28 NOTE — DISCHARGE NOTE BEHAVIORAL HEALTH - NSBHDCCRISISPLAN1FT_PSY_A_CORE
Call 911 or go to nearest ED Use health coping skills learned at hospital: Journal your feelings, phone a trusted person, take a nature walk, look through a magazine or read the paper,  listen to light music, go for a drive, develop and follow a daily structured plan, practice deep breathing techniques, start a new hobby/project at home.
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
## 2019-08-28 NOTE — DISCHARGE NOTE BEHAVIORAL HEALTH - NSBHDCCRISISPLAN2FT_PSY_A_CORE
utilize healthy habits and coping skills; learn to modify unhealthy habits and behaviors for better sxs management. Express needs in a calm non-threatening voice. Do not hold it in or you may explode one day. The key here is to learn to talk about what bothers you and not let things fester.

## 2019-08-28 NOTE — DISCHARGE NOTE BEHAVIORAL HEALTH - NSBHDCCRISISPLAN3FT_PSY_A_CORE
Attend a local self help group in your area, such as [employer]otions Anonymous (EA), or the Mood disorder support group of [place].    Good luck Marci! -Christal OT

## 2019-08-28 NOTE — DISCHARGE NOTE BEHAVIORAL HEALTH - NSBHDCSUICFCTRMIT_PSY_A_CORE
patient is intelligent, motivated for aftercare and Rx adherence, has supportive friends and family, CAMS assessment showed low risk for suicide

## 2019-08-28 NOTE — DISCHARGE NOTE BEHAVIORAL HEALTH - NSBHDCDXVALIDYESFT_PSY_A_CORE
patient reported/exhibited sx of mood disorder, which responded well to addition of Lithium to Lamictal

## 2019-08-28 NOTE — DISCHARGE NOTE BEHAVIORAL HEALTH - MEDICATION SUMMARY - MEDICATIONS TO TAKE
I will START or STAY ON the medications listed below when I get home from the hospital:    lamoTRIgine 25 mg oral tablet  -- 9 tab(s) by mouth once a day x 30 days  for mood   -- Indication: For Depressive disorder    LORazepam 1 mg oral tablet  -- 1 tab(s) by mouth 3 times a day x 30 days  for anxiety MDD:3mg  -- Indication: For Anxiety    citalopram 40 mg oral tablet  -- 1 tab(s) by mouth once a day x 30 days for depression  -- Indication: For Depressive disorder    lithium 150 mg oral capsule  -- 1 cap(s) by mouth 2 times a day x 30 days  for mood   -- Indication: For Depressive disorder    docusate sodium 100 mg oral capsule  -- 1 cap(s) by mouth 2 times a day x 30 days  for constipation   -- Indication: For constipation    melatonin 3 mg oral tablet  -- 1 tab(s) by mouth once a day (at bedtime) x 30 days, As Needed -Insomnia   -- Indication: For insomnia    pantoprazole 40 mg oral delayed release tablet  -- 1 tab(s) by mouth once a day (before a meal) x 30 days   -- Indication: For GERD (gastroesophageal reflux disease)    nitrofurantoin macrocrystals-monohydrate 100 mg oral capsule  -- 1 cap(s) by mouth 2 times a day (with meals) x 3 days   -- Indication: For Abnormal urine

## 2019-08-28 NOTE — DISCHARGE NOTE BEHAVIORAL HEALTH - NSBHDCPLANTRANSMIT_PSY_A_CORE
Zoya Lopez, MAXWELL, and Iain Brown, MARIEW - phone [phone], fax [phone]/fax/ (specify name)

## 2019-08-28 NOTE — DISCHARGE NOTE BEHAVIORAL HEALTH - MEDICATION SUMMARY - MEDICATIONS TO STOP TAKING
I will STOP taking the medications listed below when I get home from the hospital:    CeleXA 40 mg oral tablet  -- 1 tab(s) by mouth once a day    LaMICtal  -- 300 milligram(s) by mouth once a day    Xanax 2 mg oral tablet  -- 1 tab(s) by mouth 3 times a day, As Needed

## 2019-08-28 NOTE — CONSULT NOTE ADULT - ASSESSMENT
The patient is a 29 year old male with a history of bipolar disorder, borderline personality disorder who is admitted to inpatient psychiatry.     Plan:  - ECG with no significant abnormalities  - QTc borderline prolonged at 475  - There is no cardiac contraindication to continuing or starting any additional QTc prolonging medications. Can repeat an ECG 1-2 days after initiation to evaluate for any significant changes in QTc.

## 2019-08-28 NOTE — PROGRESS NOTE BEHAVIORAL HEALTH - NSBHFUPINTERVALHXFT_PSY_A_CORE
Met with and evaluated patient. with Dr. Juarez.   Chart reviewed and case discussed in tx team meeting.  No significant interval events are reported except patient had submitted a 72 hour letter and continues to report she wants to leave tomorrow.  Reports she feels stable for discharge. Discussed DC with patient's  via telephone and her friend Tricia.  Both are ok with patient being DC tomorrow. . Patient has no sx EPS today. Patient is verbal and cooperative and makes her needs known. tolerating Lithium well.  for LL in AM.  Advised to use reliable birth control as the Lithium, Lamictal and Ativan can cause birth defects, and reports she will do so.  Again discussed Rx, and tx on unit and 72 hour letter with patient and her friend Tricia, at patient's request.  Explored issues with patient and her friend and discussed DC options.  Patient continues to want to go to psychiatrist, and appt on 9/13 was confirmed by SW.  Spoke to patient's  Peewee ([phone]) who had concerns about patient's Rx.  He was concerned about the EPS patient had from Haldol, and he was informed Haldol has been DC. Was also concerned about patient having one dose of Haldol while on Lithium.  Per article in [employer],  by John Mayfield and Marcellus, Lithium and Haldol can have SE, but have more side effects if Haldol is given as decanoate, and that the evidence shows lithium and Haldol given together are safe and effective, as long as providers monitor for neurotoxicity.     Explained to patient's  that patient was on a very low dose of lithium (150mg) and Haldol 5mg, and that EPS have since resolved.  Discussed this with patient and her friend Tricia as well.  Patient had wanted to cross titrate Celexa and Zoloft, but since she is being DC tomorrow, was advised to do this with outpatient provider in case there are any problems or SE.  Denies any SI or HI.  No Rx SE or sx TD/EPS  are noted or reported today. Reports improved anxiety management on Ativan. .  Dr. Cox saw patient for cardiology consult, much appreciated.  Per Dr. Cox, patient can continue current Rx. CAMS assessment done with patient showing low risk for suicide,  She is able to ID reasons to live " My daughter, my friends, my , myself". She also IDs appropriate coping skills such as coloring, writing and listening to music.  She reports she had no plan to harm herself, and would not kill herself.  She reports if she has SI after DC she will:  go to the nearest ED, or call 911, or call the  Crisis Center or the Suicide Hotline (patient was given those phone numbers) and/or go to the [facility] in Crisis Center (was given address and phone number)  She reports her family and friends are very supportive, and her friend Tricia is a Crisis Counselor and is very helpful to her. Met with and evaluated patient. with Dr. Juarez.   Chart reviewed and case discussed in tx team meeting.  No significant interval events are reported except patient had submitted a 72 hour letter and continues to report she wants to leave tomorrow.  Reports she feels stable for discharge. Discussed DC with patient's  via telephone and her friend Tricia.  Both are ok with patient being DC tomorrow. . Patient has no sx EPS today. Patient is verbal and cooperative and makes her needs known. tolerating Lithium well.  for LL in AM.  Advised to use reliable birth control as the Lithium, Lamictal and Ativan can cause birth defects, and reports she will do so.  Again discussed Rx, and tx on unit and 72 hour letter with patient and her friend Tricia, at patient's request.  Explored issues with patient and her friend and discussed DC options.  Patient continues to want to go to psychiatrist, and appt on 9/13 was confirmed by SW.  Spoke to patient's  Peewee ([phone]) who had concerns about patient's Rx.  He was concerned about the EPS patient had from Haldol, and he was informed Haldol has been DC. Was also concerned about patient having one dose of Haldol while on Lithium.  Per article in [employer],  by John Mayfield and Marcellus, Lithium and Haldol can have SE, but have more side effects if Haldol is given as decanoate, and that the evidence shows lithium and Haldol given together are safe and effective, as long as providers monitor for neurotoxicity.     Explained to patient's  that patient was on a very low dose of lithium (150mg) and Haldol 5mg, and that EPS have since resolved.  Discussed this with patient and her friend Tricia as well.  Patient had wanted to cross titrate Celexa and Zoloft, but since she is being DC tomorrow, was advised to do this with outpatient provider in case there are any problems or SE.  Denies any SI or HI.  No Rx SE or sx TD/EPS  are noted or reported today. Reports improved anxiety management on Ativan. .  Dr. Cox saw patient for cardiology consult, much appreciated.  Per Dr. Cox, patient can continue current Rx. CAMS assessment done with patient showing low risk for suicide,  She is able to ID reasons to live " My daughter, my friends, my , myself". She also IDs appropriate coping skills such as coloring, writing and listening to music.  She reports she had no plan to harm herself, and would not kill herself.  She reports if she has SI after DC she will:  go to the nearest ED, or call 911, or call the  Crisis Center or the Suicide Hotline (patient was given those phone numbers) and/or go to the [facility] (was given address and phone number)  She reports her family and friends are very supportive, and her friend Tricia is a Crisis Counselor and is very helpful to her.    08/28/2019: Patient was seen today AM again with Katina ALLISON. She endorses symptomatic improvement of mood, but continues to have issues with Anxiety. Writer questioned to describe her anxiety, she added that she has increased palpitations, feels shaky, sweaty with some chest palpitations. Earlier was seen and cleared by Cardiology. She was advised to start Zoloft 25 mg along with Celexa 40 mg daily. Her QTC is around 475 msec, and was advised to start Zoloft as out-patient and to taper off from Celexa 40 mg/day. She is also on Lithium. She earlier wrote a 3-D letter for discharge. To be discharged tomorrow. Met with and evaluated patient. with Dr. Juarez.   Chart reviewed and case discussed in tx team meeting.  No significant interval events are reported except patient had submitted a 72 hour letter and continues to report she wants to leave tomorrow.  Reports she feels stable for discharge. Discussed DC with patient's  via telephone and her friend Tricia.  Both are ok with patient being DC tomorrow. . Patient has no sx EPS today. Patient is verbal and cooperative and makes her needs known. tolerating Lithium well.  for LL in AM.  Advised to use reliable birth control as the Lithium, Lamictal and Ativan can cause birth defects, and reports she will do so.  Again discussed Rx, and tx on unit and 72 hour letter with patient and her friend Tricia, at patient's request.  Explored issues with patient and her friend and discussed DC options.  Patient continues to want to go to psychiatrist, and appt on 9/13 was confirmed by SW.  Spoke to patient's  Peewee ([phone]) who had concerns about patient's Rx.  He was concerned about the EPS patient had from Haldol, and he was informed Haldol has been DC. Was also concerned about patient having one dose of Haldol while on Lithium.  Per article in [employer],  by John Mayfield and Marcellus, Lithium and Haldol can have SE, but have more side effects if Haldol is given as decanoate, and that the evidence shows lithium and Haldol given together are safe and effective, as long as providers monitor for neurotoxicity.     Explained to patient's  that patient was on a very low dose of lithium (150mg) and Haldol 5mg, and that EPS have since resolved.  Discussed this with patient and her friend Tricia as well.  Patient had wanted to cross titrate Celexa and Zoloft, but since she is being DC tomorrow, she reports (and her friend agrees) she wants  to do this with outpatient provider in case there are any problems or SE.  Denies any SI or HI.  No Rx SE or sx TD/EPS  are noted or reported today. Reports improved anxiety management on Ativan. .  Dr. Cox saw patient for cardiology consult, much appreciated.  Per Dr. Cox, patient can continue current Rx. CAMS assessment done with patient showing low risk for suicide,  She is able to ID reasons to live " My daughter, my friends, my , myself". She also IDs appropriate coping skills such as coloring, writing and listening to music.  She reports she had no plan to harm herself, and would not kill herself.  She reports if she has SI after DC she will:  go to the nearest ED, or call 911, or call the  Crisis Center or the Suicide Hotline (patient was given those phone numbers) and/or go to the [facility] Crisis [facility] (was given address and phone number)  She reports her family and friends are very supportive, and her friend Tricia is a Crisis Counselor and is very helpful to her.    08/28/2019: Patient was seen today AM again with Kaitna ALLISON. She endorses symptomatic improvement of mood, but continues to have issues with Anxiety. Writer questioned to describe her anxiety, she added that she has increased palpitations, feels shaky, sweaty with some chest palpitations. Earlier was seen and cleared by Cardiology. She was advised to start Zoloft 25 mg along with Celexa 40 mg daily. Her QTC is around 475 msec, and was advised to start Zoloft as out-patient and to taper off from Celexa 40 mg/day. She is also on Lithium. She earlier wrote a 3-D letter for discharge. To be discharged tomorrow.

## 2019-08-29 VITALS
SYSTOLIC BLOOD PRESSURE: 141 MMHG | TEMPERATURE: 98 F | RESPIRATION RATE: 16 BRPM | DIASTOLIC BLOOD PRESSURE: 81 MMHG | HEART RATE: 93 BPM | OXYGEN SATURATION: 98 %

## 2019-08-29 LAB — LITHIUM SERPL-MCNC: 0.24 MMOL/L — LOW (ref 0.6–1.2)

## 2019-08-29 PROCEDURE — 93005 ELECTROCARDIOGRAM TRACING: CPT

## 2019-08-29 PROCEDURE — 80061 LIPID PANEL: CPT

## 2019-08-29 PROCEDURE — 80178 ASSAY OF LITHIUM: CPT

## 2019-08-29 PROCEDURE — 83036 HEMOGLOBIN GLYCOSYLATED A1C: CPT

## 2019-08-29 PROCEDURE — 99239 HOSP IP/OBS DSCHRG MGMT >30: CPT

## 2019-08-29 PROCEDURE — 84443 ASSAY THYROID STIM HORMONE: CPT

## 2019-08-29 PROCEDURE — 86780 TREPONEMA PALLIDUM: CPT

## 2019-08-29 PROCEDURE — 36415 COLL VENOUS BLD VENIPUNCTURE: CPT

## 2019-08-29 RX ADMIN — Medication 1 PATCH: at 08:40

## 2019-08-29 RX ADMIN — Medication 1 PATCH: at 08:42

## 2019-08-29 RX ADMIN — LAMOTRIGINE 225 MILLIGRAM(S): 25 TABLET, ORALLY DISINTEGRATING ORAL at 08:39

## 2019-08-29 RX ADMIN — Medication 1 MILLIGRAM(S): at 08:39

## 2019-08-29 RX ADMIN — Medication 100 MILLIGRAM(S): at 08:40

## 2019-08-29 RX ADMIN — PANTOPRAZOLE SODIUM 40 MILLIGRAM(S): 20 TABLET, DELAYED RELEASE ORAL at 07:58

## 2019-08-29 RX ADMIN — CITALOPRAM 40 MILLIGRAM(S): 10 TABLET, FILM COATED ORAL at 08:40

## 2019-08-29 RX ADMIN — Medication 1 MILLIGRAM(S): at 12:48

## 2019-08-29 RX ADMIN — LITHIUM CARBONATE 150 MILLIGRAM(S): 300 TABLET, EXTENDED RELEASE ORAL at 08:39

## 2019-08-29 NOTE — PROGRESS NOTE BEHAVIORAL HEALTH - NSBHATTESTSEENBY_PSY_A_CORE
NP without Attending Psychiatrist

## 2019-08-29 NOTE — PROGRESS NOTE BEHAVIORAL HEALTH - NSBHADMITIPOBSFT_PSY_A_CORE
Denies current SI or HI and is in behavioral control

## 2019-08-29 NOTE — PROGRESS NOTE BEHAVIORAL HEALTH - NSBHFUPINTERVALCCFT_PSY_A_CORE
" My mood is stable and good, but I still have some anxiety"
" I am feeling much better, but I still have some anxiety"
" I was having a panic attack, so I went to the hospital"
" I feel better.  I feel ready to go home"
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
## 2019-08-29 NOTE — PROGRESS NOTE BEHAVIORAL HEALTH - NSBHCHARTREVIEWIMAGING_PSY_A_CORE FT
CXR shows no active disease

## 2019-08-29 NOTE — PROGRESS NOTE BEHAVIORAL HEALTH - NSBHADMITCOORDWITH_PSY_A_CORE
social work/discussed with Dr. Juarez
discussed with Dr. Juarez/social work
discussed with Dr. Juarez and Dr. Cox/medical staff/social work
discussed with Dr. Juarez/social work
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
## 2019-08-29 NOTE — PROGRESS NOTE BEHAVIORAL HEALTH - NSBHADMITIPBHPROVFT_PSY_A_CORE
called [facility] in [place]  ([phone])  They will not release info unless a consent is faxed to them and consent was faxed to them.  Called again on 8/27, and left message for Ms Bui patient's provider to call me back.  Spoke with Ms. Bui on 8/27, and she reports that patient struggles with anxiety, and has difficulty with mood.  She also discussed that patient had difficulty with SIBs.

## 2019-08-29 NOTE — PROGRESS NOTE BEHAVIORAL HEALTH - NSBHADMITMEDEDUDETAILS_A_CORE FT
Psychoeducation  again provided re:  need for Rx and aftercare adherence ( and to follow up with ED or [facility] for any c/o or problems,) for sx management and relapse prevention and to use effective birth control as Rx can cause birth defects ( Lamictal, Lithium and Ativan)  with teach back verbalized
Psychoeducation provided re:  need for Rx and aftercare adherence for sx management and relapse prevention and to use effective birth control as Rx can cause birth defects ( Lamictal, Lithium and Ativan)  with teach back verbalized
Psychoeducation provided re:  potential benefits/SE of Lithium with teach back verbalized and Care Notes given
Psychoeducation provided re:  potential benefits/SE of Lithium with teach back verbalized and Care Notes given

## 2019-08-29 NOTE — PROGRESS NOTE BEHAVIORAL HEALTH - NSBHADMITCOUNSEL_PSY_A_CORE
risks and benefits of treatment options/risk factor reduction/diagnostic results/impressions and/or recommended studies/importance of adherence to chosen treatment/instructions for management, treatment and follow up/client/family/caregiver education
diagnostic results/impressions and/or recommended studies/risks and benefits of treatment options/instructions for management, treatment and follow up/risk factor reduction/client/family/caregiver education/importance of adherence to chosen treatment
importance of adherence to chosen treatment/diagnostic results/impressions and/or recommended studies/risks and benefits of treatment options
risks and benefits of treatment options/diagnostic results/impressions and/or recommended studies/importance of adherence to chosen treatment

## 2019-09-03 NOTE — PROGRESS NOTE BEHAVIORAL HEALTH - THOUGHT CONTENT
Unremarkable
Azithromycin Pregnancy And Lactation Text: This medication is considered safe during pregnancy and is also secreted in breast milk.

## 2020-01-01 NOTE — ED ADULT TRIAGE NOTE - ADDITIONAL SAFETY/BANDS...
This note was copied from the mother's chart.  Lactation Rounds:     Visited mother at bedside. Language line utilized throughout encounter to ensure patient understanding. Mother was holding swaddled, sleeping infant in her arms and reported that infant's first feeding went well. Mother reported experience breastfeeding her first child for over 18 months, weaning during this pregnancy due to increase in nipple soreness. First baby was  exclusively except for some formula supplementation in first week of life due to jaundice. Discussed differences in nursing a  versus and older child.     Admit teaching done, including feeding on demand, recognition of feeding cues, expectations for infant feeding/behavior in first day of life, importance of skin to skin contact, hand expression, risks of artificial nipples and pacifiers, risks of non-medically indicated formula supplementation. Lactation phone number was provided with encouragement to call with any questions or concerns or for observation of/assistance with next feeding.        Additional Safety/Bands:

## 2020-06-10 NOTE — ED ADULT NURSE NOTE - CADM POA CENTRAL LINE
88 year old male with a history of CKD stage 5, anemia on chronic disease and previous GI bleed status post colonoscopy with two ulcerated polyps s/p resections and colonic AVMs, EGD with gastritis, VPCs coming to hospital with complaints of RLE pain.             Time spent on patients discharge 32 minutes 88 year old male with a history of CKD stage 5, anemia on chronic disease and previous GI bleed status post colonoscopy with two ulcerated polyps s/p resections and colonic AVMs, EGD with gastritis, VPCs coming to hospital with complaints of RLE pain.  Patient found to have pad s/p angiogram and angioplasty via vascular, now stable for discharge per vascular d/w Dr Sorenson (on 6/9). Patient s/p ILR via cardiology EP. patient now stable for discharge.             Time spent on patients discharge 32 minutes No

## 2020-08-21 PROBLEM — F17.200 NICOTINE DEPENDENCE, UNSPECIFIED, UNCOMPLICATED: Chronic | Status: ACTIVE | Noted: 2019-08-25

## 2020-08-21 PROBLEM — O14.90 UNSPECIFIED PRE-ECLAMPSIA, UNSPECIFIED TRIMESTER: Chronic | Status: ACTIVE | Noted: 2019-08-25

## 2020-08-21 PROBLEM — Z00.00 ENCOUNTER FOR PREVENTIVE HEALTH EXAMINATION: Status: ACTIVE | Noted: 2020-08-21

## 2020-08-21 PROBLEM — K21.9 GASTRO-ESOPHAGEAL REFLUX DISEASE WITHOUT ESOPHAGITIS: Chronic | Status: ACTIVE | Noted: 2019-08-25

## 2020-10-28 ENCOUNTER — APPOINTMENT (OUTPATIENT)
Dept: OBGYN | Facility: CLINIC | Age: 30
End: 2020-10-28
Payer: COMMERCIAL

## 2020-10-28 VITALS
HEIGHT: 59 IN | SYSTOLIC BLOOD PRESSURE: 137 MMHG | DIASTOLIC BLOOD PRESSURE: 94 MMHG | WEIGHT: 254 LBS | BODY MASS INDEX: 51.2 KG/M2

## 2020-10-28 DIAGNOSIS — N92.0 EXCESSIVE AND FREQUENT MENSTRUATION WITH REGULAR CYCLE: ICD-10-CM

## 2020-10-28 DIAGNOSIS — Z82.49 FAMILY HISTORY OF ISCHEMIC HEART DISEASE AND OTHER DISEASES OF THE CIRCULATORY SYSTEM: ICD-10-CM

## 2020-10-28 DIAGNOSIS — Z78.9 OTHER SPECIFIED HEALTH STATUS: ICD-10-CM

## 2020-10-28 DIAGNOSIS — N94.6 DYSMENORRHEA, UNSPECIFIED: ICD-10-CM

## 2020-10-28 DIAGNOSIS — Z80.0 FAMILY HISTORY OF MALIGNANT NEOPLASM OF DIGESTIVE ORGANS: ICD-10-CM

## 2020-10-28 DIAGNOSIS — Z86.2 PERSONAL HISTORY OF DISEASES OF THE BLOOD AND BLOOD-FORMING ORGANS AND CERTAIN DISORDERS INVOLVING THE IMMUNE MECHANISM: ICD-10-CM

## 2020-10-28 DIAGNOSIS — Z86.59 PERSONAL HISTORY OF OTHER MENTAL AND BEHAVIORAL DISORDERS: ICD-10-CM

## 2020-10-28 DIAGNOSIS — Z86.79 PERSONAL HISTORY OF OTHER DISEASES OF THE CIRCULATORY SYSTEM: ICD-10-CM

## 2020-10-28 DIAGNOSIS — Z01.419 ENCOUNTER FOR GYNECOLOGICAL EXAMINATION (GENERAL) (ROUTINE) W/OUT ABNORMAL FINDINGS: ICD-10-CM

## 2020-10-28 DIAGNOSIS — Z83.3 FAMILY HISTORY OF DIABETES MELLITUS: ICD-10-CM

## 2020-10-28 DIAGNOSIS — F17.200 NICOTINE DEPENDENCE, UNSPECIFIED, UNCOMPLICATED: ICD-10-CM

## 2020-10-28 DIAGNOSIS — Z72.89 OTHER PROBLEMS RELATED TO LIFESTYLE: ICD-10-CM

## 2020-10-28 PROCEDURE — 99072 ADDL SUPL MATRL&STAF TM PHE: CPT

## 2020-10-28 PROCEDURE — 99213 OFFICE O/P EST LOW 20 MIN: CPT | Mod: 25

## 2020-10-28 PROCEDURE — 99385 PREV VISIT NEW AGE 18-39: CPT

## 2020-10-28 NOTE — HISTORY OF PRESENT ILLNESS
[FreeTextEntry1] : 30 year old female presents for wwe.  She is a new patient to our practice.  Her last annual exam was in 2016 with Dr. Hirsch.  She has not seen a gyn since she delivered in 2016.  She would like to start contraception.  Menarche was at age 13.  Menses are q 28 days, lasting 3-4 days.  She does have severe cramping.  States she uses pamprin with no relief.  Her menses are also very heavy for the first 2 days.  She is sexually active with her .  Denies ovarian cysts, fibroids, endometriosis, STD's and abnormal pap's.  She has never been on contraception before.  \par \par PMH is significant for anxiety and depression (followed by psych and a therapist), anemia and history of a heart murmur.  She has no PSH.  Family history is significant for a paternal grandfather with colon cancer in his late 70's.  Her mother had cervical cancer.  She does socially smoke and drink.  No illicit drugs.  Gyn history as above.  .  Medical top and FT, VD complicated by PEC with severe features requiring ICU admission after deliver to control BP's.  she is allergic to PCN.  She takes lamictal and klonopin.

## 2020-10-28 NOTE — PLAN
[FreeTextEntry1] : 30 year old female wwe\par \par 1. Pap done\par 2. SBE reviewed\par 3. Counseled on all contraception options.  Patient with menorrhagia and dysmenorrhea.  +/- of all options discussed with the patient.  She would like to try a combined OCP.  Rx taytulla x 3 months.  +/- d/w patient.  Will RTO in 3 months for OCP and BP check.

## 2020-10-30 LAB — CYTOLOGY CVX/VAG DOC THIN PREP: NORMAL

## 2021-01-26 ENCOUNTER — APPOINTMENT (OUTPATIENT)
Dept: OBGYN | Facility: CLINIC | Age: 31
End: 2021-01-26
Payer: COMMERCIAL

## 2021-01-26 VITALS
SYSTOLIC BLOOD PRESSURE: 137 MMHG | DIASTOLIC BLOOD PRESSURE: 89 MMHG | BODY MASS INDEX: 51.81 KG/M2 | HEIGHT: 59 IN | HEART RATE: 112 BPM | WEIGHT: 257 LBS

## 2021-01-26 DIAGNOSIS — Z30.41 ENCOUNTER FOR SURVEILLANCE OF CONTRACEPTIVE PILLS: ICD-10-CM

## 2021-01-26 PROCEDURE — 99072 ADDL SUPL MATRL&STAF TM PHE: CPT

## 2021-01-26 PROCEDURE — 99214 OFFICE O/P EST MOD 30 MIN: CPT

## 2021-01-26 RX ORDER — NORETHINDRONE ACETATE AND ETHINYL ESTRADIOL, AND FERROUS FUMARATE 1MG-20(24)
1-20 KIT ORAL
Qty: 3 | Refills: 0 | Status: DISCONTINUED | COMMUNITY
Start: 2020-10-28 | End: 2021-01-26

## 2021-01-26 RX ORDER — NORETHINDRONE ACETATE AND ETHINYL ESTRADIOL 1MG-20(24)
1-20 KIT ORAL DAILY
Qty: 3 | Refills: 0 | Status: DISCONTINUED | COMMUNITY
Start: 2020-10-29 | End: 2021-01-26

## 2021-01-26 NOTE — HISTORY OF PRESENT ILLNESS
[FreeTextEntry1] : 29 yo her for fu for new pill start. she has not been liking the blisovi . MOre headaches, not atypical SHe thinks she has gotten more acne.

## 2021-02-16 NOTE — CONSULT NOTE ADULT - CONSULT REQUESTED BY NAME
[address] [address]                                OPERATIVE REPORT    PATIENT NAME: Hazel Huizar                   :        1948  MED REC NO:   [id number]                            ROOM:       4907  ACCOUNT NO:   [de-identified]                           ADMIT DATE: 2021  PROVIDER:     Bianca Banegas MD    DATE OF PROCEDURE:  02/15/SY/V_CGNOS_I  Job#: [id number]     Doc#: [id number]    CC:  MD Murali Trejo MD

## 2021-06-02 ENCOUNTER — EMERGENCY (EMERGENCY)
Facility: HOSPITAL | Age: 31
LOS: 1 days | Discharge: HOME | End: 2021-06-02
Attending: EMERGENCY MEDICINE
Payer: COMMERCIAL

## 2021-06-02 VITALS
RESPIRATION RATE: 20 BRPM | SYSTOLIC BLOOD PRESSURE: 135 MMHG | HEIGHT: 59 IN | DIASTOLIC BLOOD PRESSURE: 89 MMHG | TEMPERATURE: 98 F | OXYGEN SATURATION: 99 % | WEIGHT: 210.1 LBS | HEART RATE: 98 BPM

## 2021-06-02 VITALS
TEMPERATURE: 98 F | DIASTOLIC BLOOD PRESSURE: 80 MMHG | SYSTOLIC BLOOD PRESSURE: 128 MMHG | OXYGEN SATURATION: 98 % | RESPIRATION RATE: 16 BRPM | HEART RATE: 87 BPM

## 2021-06-02 LAB
ALBUMIN SERPL ELPH-MCNC: 3.8 G/DL — SIGNIFICANT CHANGE UP (ref 3.3–5.2)
ALP SERPL-CCNC: 84 U/L — SIGNIFICANT CHANGE UP (ref 40–120)
ALT FLD-CCNC: 16 U/L — SIGNIFICANT CHANGE UP
ANION GAP SERPL CALC-SCNC: 10 MMOL/L — SIGNIFICANT CHANGE UP (ref 5–17)
AST SERPL-CCNC: 13 U/L — SIGNIFICANT CHANGE UP
BASOPHILS # BLD AUTO: 0.03 K/UL — SIGNIFICANT CHANGE UP (ref 0–0.2)
BASOPHILS NFR BLD AUTO: 0.3 % — SIGNIFICANT CHANGE UP (ref 0–2)
BILIRUB SERPL-MCNC: <0.2 MG/DL — LOW (ref 0.4–2)
BUN SERPL-MCNC: 14.7 MG/DL — SIGNIFICANT CHANGE UP (ref 8–20)
CALCIUM SERPL-MCNC: 9.3 MG/DL — SIGNIFICANT CHANGE UP (ref 8.6–10.2)
CHLORIDE SERPL-SCNC: 103 MMOL/L — SIGNIFICANT CHANGE UP (ref 98–107)
CO2 SERPL-SCNC: 24 MMOL/L — SIGNIFICANT CHANGE UP (ref 22–29)
CREAT SERPL-MCNC: 0.51 MG/DL — SIGNIFICANT CHANGE UP (ref 0.5–1.3)
EOSINOPHIL # BLD AUTO: 0.07 K/UL — SIGNIFICANT CHANGE UP (ref 0–0.5)
EOSINOPHIL NFR BLD AUTO: 0.7 % — SIGNIFICANT CHANGE UP (ref 0–6)
GLUCOSE SERPL-MCNC: 112 MG/DL — HIGH (ref 70–99)
HCG SERPL-ACNC: <4 MIU/ML — SIGNIFICANT CHANGE UP
HCT VFR BLD CALC: 38.2 % — SIGNIFICANT CHANGE UP (ref 34.5–45)
HGB BLD-MCNC: 12 G/DL — SIGNIFICANT CHANGE UP (ref 11.5–15.5)
IMM GRANULOCYTES NFR BLD AUTO: 0.4 % — SIGNIFICANT CHANGE UP (ref 0–1.5)
LYMPHOCYTES # BLD AUTO: 2.59 K/UL — SIGNIFICANT CHANGE UP (ref 1–3.3)
LYMPHOCYTES # BLD AUTO: 25 % — SIGNIFICANT CHANGE UP (ref 13–44)
MCHC RBC-ENTMCNC: 24.6 PG — LOW (ref 27–34)
MCHC RBC-ENTMCNC: 31.4 GM/DL — LOW (ref 32–36)
MCV RBC AUTO: 78.4 FL — LOW (ref 80–100)
MONOCYTES # BLD AUTO: 0.55 K/UL — SIGNIFICANT CHANGE UP (ref 0–0.9)
MONOCYTES NFR BLD AUTO: 5.3 % — SIGNIFICANT CHANGE UP (ref 2–14)
NEUTROPHILS # BLD AUTO: 7.08 K/UL — SIGNIFICANT CHANGE UP (ref 1.8–7.4)
NEUTROPHILS NFR BLD AUTO: 68.3 % — SIGNIFICANT CHANGE UP (ref 43–77)
PLATELET # BLD AUTO: 347 K/UL — SIGNIFICANT CHANGE UP (ref 150–400)
POTASSIUM SERPL-MCNC: 4.3 MMOL/L — SIGNIFICANT CHANGE UP (ref 3.5–5.3)
POTASSIUM SERPL-SCNC: 4.3 MMOL/L — SIGNIFICANT CHANGE UP (ref 3.5–5.3)
PROT SERPL-MCNC: 6.9 G/DL — SIGNIFICANT CHANGE UP (ref 6.6–8.7)
RBC # BLD: 4.87 M/UL — SIGNIFICANT CHANGE UP (ref 3.8–5.2)
RBC # FLD: 14.6 % — HIGH (ref 10.3–14.5)
SODIUM SERPL-SCNC: 137 MMOL/L — SIGNIFICANT CHANGE UP (ref 135–145)
WBC # BLD: 10.36 K/UL — SIGNIFICANT CHANGE UP (ref 3.8–10.5)
WBC # FLD AUTO: 10.36 K/UL — SIGNIFICANT CHANGE UP (ref 3.8–10.5)

## 2021-06-02 PROCEDURE — 74177 CT ABD & PELVIS W/CONTRAST: CPT | Mod: 26,ME

## 2021-06-02 PROCEDURE — G1004: CPT

## 2021-06-02 PROCEDURE — 84702 CHORIONIC GONADOTROPIN TEST: CPT

## 2021-06-02 PROCEDURE — 76856 US EXAM PELVIC COMPLETE: CPT | Mod: 26

## 2021-06-02 PROCEDURE — 76856 US EXAM PELVIC COMPLETE: CPT

## 2021-06-02 PROCEDURE — 80053 COMPREHEN METABOLIC PANEL: CPT

## 2021-06-02 PROCEDURE — 96374 THER/PROPH/DIAG INJ IV PUSH: CPT | Mod: XU

## 2021-06-02 PROCEDURE — 76830 TRANSVAGINAL US NON-OB: CPT

## 2021-06-02 PROCEDURE — 99284 EMERGENCY DEPT VISIT MOD MDM: CPT

## 2021-06-02 PROCEDURE — 76830 TRANSVAGINAL US NON-OB: CPT | Mod: 26

## 2021-06-02 PROCEDURE — 36415 COLL VENOUS BLD VENIPUNCTURE: CPT

## 2021-06-02 PROCEDURE — 99284 EMERGENCY DEPT VISIT MOD MDM: CPT | Mod: 25

## 2021-06-02 PROCEDURE — 74177 CT ABD & PELVIS W/CONTRAST: CPT

## 2021-06-02 PROCEDURE — 96375 TX/PRO/DX INJ NEW DRUG ADDON: CPT

## 2021-06-02 PROCEDURE — 85025 COMPLETE CBC W/AUTO DIFF WBC: CPT

## 2021-06-02 RX ORDER — ONDANSETRON 8 MG/1
4 TABLET, FILM COATED ORAL ONCE
Refills: 0 | Status: COMPLETED | OUTPATIENT
Start: 2021-06-02 | End: 2021-06-02

## 2021-06-02 RX ORDER — SODIUM CHLORIDE 9 MG/ML
1000 INJECTION INTRAMUSCULAR; INTRAVENOUS; SUBCUTANEOUS ONCE
Refills: 0 | Status: COMPLETED | OUTPATIENT
Start: 2021-06-02 | End: 2021-06-02

## 2021-06-02 RX ORDER — KETOROLAC TROMETHAMINE 30 MG/ML
15 SYRINGE (ML) INJECTION ONCE
Refills: 0 | Status: DISCONTINUED | OUTPATIENT
Start: 2021-06-02 | End: 2021-06-02

## 2021-06-02 RX ORDER — MORPHINE SULFATE 50 MG/1
4 CAPSULE, EXTENDED RELEASE ORAL ONCE
Refills: 0 | Status: DISCONTINUED | OUTPATIENT
Start: 2021-06-02 | End: 2021-06-02

## 2021-06-02 RX ADMIN — SODIUM CHLORIDE 1000 MILLILITER(S): 9 INJECTION INTRAMUSCULAR; INTRAVENOUS; SUBCUTANEOUS at 11:54

## 2021-06-02 RX ADMIN — Medication 15 MILLIGRAM(S): at 16:37

## 2021-06-02 RX ADMIN — MORPHINE SULFATE 4 MILLIGRAM(S): 50 CAPSULE, EXTENDED RELEASE ORAL at 11:54

## 2021-06-02 RX ADMIN — ONDANSETRON 4 MILLIGRAM(S): 8 TABLET, FILM COATED ORAL at 11:54

## 2021-06-02 NOTE — ED PROVIDER NOTE - PATIENT PORTAL LINK FT
You can access the FollowMyHealth Patient Portal offered by [facility] by registering at the following website: [email or web address]. By joining [facility]’s FollowMyHealth portal, you will also be able to view your health information using other applications (apps) compatible with our system.

## 2021-06-02 NOTE — ED PROVIDER NOTE - PROGRESS NOTE DETAILS
patient seen by attending for suprapubic abdominal pain.  CT and labs ordered, will follow up and reassess patient states still in pain, will add on sono to r/o pathology

## 2021-07-06 PROBLEM — F31.9 BIPOLAR DISORDER, UNSPECIFIED: Chronic | Status: ACTIVE | Noted: 2021-06-02

## 2021-07-31 ENCOUNTER — APPOINTMENT (OUTPATIENT)
Dept: OBGYN | Facility: CLINIC | Age: 31
End: 2021-07-31

## 2021-10-09 RX ORDER — ETHYNODIOL DIACETATE AND ETHINYL ESTRADIOL 1 MG-35MCG
1-35 KIT ORAL
Qty: 3 | Refills: 0 | Status: ACTIVE | COMMUNITY
Start: 2021-01-26 | End: 1900-01-01

## 2021-10-14 NOTE — ED PROVIDER NOTE - NS ED MD EM SELECTION
This RN called patients daughter Sang Reilly trying to reach patient. Patients daughter states she has not seen her dad. Message given to Sang Reilly, patient appointment with Dr. Nani Griffin Tuesday 10/19/21 is cancelled, patient needs to continue with work up and see Dr. Nani Griffin Tuesday 11/16/21 at 1145AM. Reviewed patients upcoming appointments 10/15/21 at [phone] for [place] test, and Wednesday 10/20/21 at 0930 for bone biopsy. Patients daughter instructed he would be required to arrive early for his bone biopsy. Importance of patient to follow up with Urology stressed, patients daughter Sang Reilly requests we make appointment, preferably after 1000AM. This RN called [facility] and made appointment for Friday 11/5/21 at 10:00AM. Patient will be seeing Dr. Natale Collet. Patients daughter Inez Maroon called and made aware of this appointment. Inez Allison voices understanding. Nito Acosta requests this RN text her his upcoming appointments, this RN clarified the number to text was [phone]. Inez Allison states yes. Above stated appointments text to Inez Allison. [id number] Exp Problem Focused - Mod. Complex 75549 Detailed

## 2021-12-16 ENCOUNTER — RX RENEWAL (OUTPATIENT)
Age: 31
End: 2021-12-16

## 2022-02-05 ENCOUNTER — RX RENEWAL (OUTPATIENT)
Age: 32
End: 2022-02-05

## 2022-02-09 ENCOUNTER — APPOINTMENT (OUTPATIENT)
Dept: OBGYN | Facility: CLINIC | Age: 32
End: 2022-02-09

## 2022-03-03 ENCOUNTER — APPOINTMENT (OUTPATIENT)
Dept: OBGYN | Facility: CLINIC | Age: 32
End: 2022-03-03

## 2022-04-20 ENCOUNTER — APPOINTMENT (OUTPATIENT)
Dept: OBGYN | Facility: CLINIC | Age: 32
End: 2022-04-20

## 2022-06-23 ENCOUNTER — APPOINTMENT (OUTPATIENT)
Dept: OBGYN | Facility: CLINIC | Age: 32
End: 2022-06-23

## 2022-07-14 ENCOUNTER — RX RENEWAL (OUTPATIENT)
Age: 32
End: 2022-07-14

## 2022-07-14 RX ORDER — ETHYNODIOL DIACETATE AND ETHINYL ESTRADIOL 1 MG-35MCG
1-35 KIT ORAL
Qty: 28 | Refills: 0 | Status: ACTIVE | COMMUNITY
Start: 2021-12-16 | End: 1900-01-01

## 2022-08-13 NOTE — ED PROCEDURE NOTE - CPROC ED LOCAL ANESTHESIA1
[facility]  [place] Admission Note                                                                           Girl Elo Patient Status:      2022 MRN [id number]   [facility] 1NW-N Attending Dorotha Essex, MD    Ewa Green [[id number]]  [id number]    Pertinent Maternal Prenatal Labs:  Prenatal Results  Mother: Ewa Green #[id number]   Start of Mother's Information    Prenatal Results    1st Trimester Labs ([facility])     05/14/22 1103    Platelets  275.4 11(1).0 - 450.0 08/13/Arden Davidson #[id number]                Pregnancy/Delivery Complications: Abnormal glucose tolerance test during pregnancy, Anemia    Void: no  Stool:  no  Feeding: Upon admission, mother chose to exclusively use breastmilk to feed her infant    Physical Exam:  Birth Weight:  Weight: 9 lb 1.3 oz (4.12 kg) (Filed from Delivery Summary)  Birth Information:  Height: 52.1 cm (1' 8.5\") (Filed from Delivery Summary)  Head Circumference: 35 cm (Filed from Delivery Summary)  Chest Circumference (cm): 1' 1.58\" (34.5 cm) (Filed from Delivery Summary)  Weight: 9 lb 1.3 oz (4.12 kg) (Filed from Delivery Summary)     Gen:   Awake, alert, appropriate, nontoxic, in no appearant distress  Skin:   +salmon patch on forehead, nose, no petechiae, no jaundice  HEENT:  AFOSF, red reflex present bilaterally, no eye discharge, no nasal discharge, no nasal flaring, oral mucous membranes moist  Lungs:   Clear to auscultation bilaterally, equal air entry, no wheezing, no crackles  Chest:  Regular rate and rhythm, no murmur present  Abd:   Soft, nontender, nondistended, + bowel sounds, no HSM, no masses  Ext:  No cyanosis/edema/clubbing, peripheral pulses equal bilaterally, no hip clicks bilaterally  :  Normal female genatalia  Back:  No sacral dimple  Neuro:  +grasp, +suck, +rocky, good tone, no focal deficits noted    Assessment:   Infant is a  Gestational Age: 36w0d  3 cc/1% lidocaine

## 2022-12-13 NOTE — OCCUPATIONAL THERAPY INITIAL EVALUATION ADULT - DIAGNOSIS, OT EVAL
F32.9 Depressive disorder; F60.3 Borderline personality disorder FAMILY HISTORY:  Mother  Still living? Unknown  FH: breast cancer, Age at diagnosis: Age Unknown

## 2023-01-04 ENCOUNTER — APPOINTMENT (OUTPATIENT)
Dept: OBGYN | Facility: CLINIC | Age: 33
End: 2023-01-04

## 2023-01-18 NOTE — PATIENT PROFILE BEHAVIORAL HEALTH - NSBHKNWAWOLRSK_PSY_A_CORE
[Never] : Never [No] : No [0] : 2) Feeling down, depressed, or hopeless: Not at all (0) [PHQ-2 Negative - No further assessment needed] : PHQ-2 Negative - No further assessment needed [With Family] : lives with family [# Of Children ___] : has [unfilled] children [Fully functional (bathing, dressing, toileting, transferring, walking, feeding)] : Fully functional (bathing, dressing, toileting, transferring, walking, feeding) [Fully functional (using the telephone, shopping, preparing meals, housekeeping, doing laundry, using] : Fully functional and needs no help or supervision to perform IADLs (using the telephone, shopping, preparing meals, housekeeping, doing laundry, using transportation, managing medications and managing finances) [[id number]] : 0 no

## 2023-02-14 NOTE — CHART NOTE - NSCHARTNOTEFT_GEN_A_CORE
Quality 111:Pneumonia Vaccination Status For Older Adults: Pneumococcal vaccine (PPSV23) was not administered on or after patient’s 60th birthday and before the end of the measurement period, reason not otherwise specified SW Note: Pt was transferred to [facility] yesterday for inpt psychiatric care. Called ins listed on face sheet and completed precert. Spoke with Ins DEREK Mendoza at [facility] [place] [phone]. Auth approved for 5 days 8/25/19-8/29/19. Auth# [id number]. Next rev due 8/29 with ins DEREK Mike @ [phone]. Forwarded info to [facility] UR dept. Detail Level: Detailed Quality 431: Preventive Care And Screening: Unhealthy Alcohol Use - Screening: Patient not identified as an unhealthy alcohol user when screened for unhealthy alcohol use using a systematic screening method Quality 130: Documentation Of Current Medications In The Medical Record: Current Medications Documented Quality 110: Preventive Care And Screening: Influenza Immunization: Influenza Immunization previously received during influenza season Quality 226: Preventive Care And Screening: Tobacco Use: Screening And Cessation Intervention: Patient screened for tobacco use and is an ex/non-smoker

## 2024-09-25 ENCOUNTER — APPOINTMENT (OUTPATIENT)
Dept: OBGYN | Facility: CLINIC | Age: 34
End: 2024-09-25
Payer: COMMERCIAL

## 2024-09-25 VITALS
DIASTOLIC BLOOD PRESSURE: 93 MMHG | SYSTOLIC BLOOD PRESSURE: 134 MMHG | WEIGHT: 201 LBS | HEIGHT: 60 IN | BODY MASS INDEX: 39.46 KG/M2

## 2024-09-25 DIAGNOSIS — Z01.419 ENCOUNTER FOR GYNECOLOGICAL EXAMINATION (GENERAL) (ROUTINE) W/OUT ABNORMAL FINDINGS: ICD-10-CM

## 2024-09-25 DIAGNOSIS — Z23 ENCOUNTER FOR IMMUNIZATION: ICD-10-CM

## 2024-09-25 PROCEDURE — 99385 PREV VISIT NEW AGE 18-39: CPT | Mod: 25

## 2024-09-25 PROCEDURE — 90651 9VHPV VACCINE 2/3 DOSE IM: CPT

## 2024-09-25 PROCEDURE — 90471 IMMUNIZATION ADMIN: CPT

## 2024-09-25 NOTE — PHYSICAL EXAM
[Appropriately responsive] : appropriately responsive [Alert] : alert [No Acute Distress] : no acute distress [No Murmurs] : no murmurs [Soft] : soft [Non-tender] : non-tender [Non-distended] : non-distended [No HSM] : No HSM [No Lesions] : no lesions [No Mass] : no mass [Oriented x3] : oriented x3 [Examination Of The Breasts] : a normal appearance [No Discharge] : no discharge [No Masses] : no breast masses were palpable [Labia Majora] : normal [Labia Minora] : normal [Normal] : normal [Uterine Adnexae] : non-palpable

## 2024-09-25 NOTE — HISTORY OF PRESENT ILLNESS
[FreeTextEntry1] : 33 yo female presents for well woman care and would like to begin HPV vaccination series.  Last gyn exam was 10/2020, pap was normal.  She reports since previous visit she started Zepbound 1 yr ago and has lost 110 lbs thus far.  She is managed for mood DO, takes Lamictal, Klonopin and feels that she is managed well.  She is not currently SA and declines STD screening.  Her mother dies of Cervical Ca and so she would like vaccination.   Otherwise she has no specific gyn complaints and feels her menses are manageable.

## 2024-09-25 NOTE — DISCUSSION/SUMMARY
[FreeTextEntry1] : 35 yo female for well woman care.  NL PE today.  Desires HPV vax.  Pap done HPV inj #1 given, to RTO at 1 mos and 3 mos from today for remaining series Condoms reinforced Diet/exercise/nutrition rev'd RTO 4 weeks. RW

## 2024-09-27 LAB — HPV HIGH+LOW RISK DNA PNL CVX: NOT DETECTED

## 2024-10-02 LAB — CYTOLOGY CVX/VAG DOC THIN PREP: NORMAL

## 2024-10-21 NOTE — ED PROVIDER NOTE - NSSUBSTANCEUSE_GEN_ALL_CORE_SD
Alert-The patient is alert, awake and responds to voice. The patient is oriented to time, place, and person. The triage nurse is able to obtain subjective information.
never used

## 2024-10-31 ENCOUNTER — APPOINTMENT (OUTPATIENT)
Dept: OBGYN | Facility: CLINIC | Age: 34
End: 2024-10-31

## 2025-02-28 NOTE — ED ADULT TRIAGE NOTE - MEANS OF ARRIVAL
Quality 431: Preventive Care And Screening: Unhealthy Alcohol Use - Screening: Patient not identified as an unhealthy alcohol user when screened for unhealthy alcohol use using a systematic screening method Quality 130: Documentation Of Current Medications In The Medical Record: Current Medications Documented Detail Level: Detailed stretcher Quality 226: Preventive Care And Screening: Tobacco Use: Screening And Cessation Intervention: Patient screened for tobacco use and is an ex/non-smoker

## 2025-06-11 NOTE — H&P ADULT - NSHPLABSRESULTS_GEN_ALL_CORE
[facility] for di di twins due to difficult to monitor in clinic due to gestation. Pt states Baby A is male and Baby B is female and is IUGR, which was diagnosed last week.    12.0   7.22  )-----------( 278      ( 24 Aug 2019 13:11 )             39.2     24 Aug 2019 13:11    137    |  100    |  9.0    ----------------------------<  86     4.4     |  26.0   |  0.58     Ca    9.7        24 Aug 2019 13:11    TPro  7.2    /  Alb  4.6    /  TBili  0.4    /  DBili  x      /  AST  14     /  ALT  14     /  AlkPhos  63     24 Aug 2019 13:11    PT/INR - ( 24 Aug 2019 13:11 )   PT: 13.0 sec;   INR: 1.13 ratio         PTT - ( 24 Aug 2019 13:11 )  PTT:33.9 sec